# Patient Record
Sex: FEMALE | Race: WHITE | NOT HISPANIC OR LATINO
[De-identification: names, ages, dates, MRNs, and addresses within clinical notes are randomized per-mention and may not be internally consistent; named-entity substitution may affect disease eponyms.]

---

## 2017-07-05 ENCOUNTER — APPOINTMENT (OUTPATIENT)
Dept: SURGERY | Facility: CLINIC | Age: 52
End: 2017-07-05

## 2019-01-30 ENCOUNTER — RECORD ABSTRACTING (OUTPATIENT)
Age: 54
End: 2019-01-30

## 2019-01-30 DIAGNOSIS — Z82.5 FAMILY HISTORY OF ASTHMA AND OTHER CHRONIC LOWER RESPIRATORY DISEASES: ICD-10-CM

## 2019-01-30 DIAGNOSIS — Z86.39 PERSONAL HISTORY OF OTHER ENDOCRINE, NUTRITIONAL AND METABOLIC DISEASE: ICD-10-CM

## 2019-01-30 DIAGNOSIS — Z87.19 PERSONAL HISTORY OF OTHER DISEASES OF THE DIGESTIVE SYSTEM: ICD-10-CM

## 2019-01-30 DIAGNOSIS — E05.20 THYROTOXICOSIS WITH TOXIC MULTINODULAR GOITER W/OUT THYROTOXIC CRISIS OR STORM: ICD-10-CM

## 2019-01-30 DIAGNOSIS — Z82.49 FAMILY HISTORY OF ISCHEMIC HEART DISEASE AND OTHER DISEASES OF THE CIRCULATORY SYSTEM: ICD-10-CM

## 2019-01-30 DIAGNOSIS — Z87.39 PERSONAL HISTORY OF OTHER DISEASES OF THE MUSCULOSKELETAL SYSTEM AND CONNECTIVE TISSUE: ICD-10-CM

## 2019-01-30 DIAGNOSIS — E55.9 VITAMIN D DEFICIENCY, UNSPECIFIED: ICD-10-CM

## 2019-05-01 ENCOUNTER — RX RENEWAL (OUTPATIENT)
Age: 54
End: 2019-05-01

## 2019-10-14 ENCOUNTER — APPOINTMENT (OUTPATIENT)
Dept: INTERNAL MEDICINE | Facility: CLINIC | Age: 54
End: 2019-10-14

## 2019-12-18 ENCOUNTER — EMERGENCY (EMERGENCY)
Facility: HOSPITAL | Age: 54
LOS: 1 days | Discharge: LEFT BEFORE DISCHARGE | End: 2019-12-18
Attending: STUDENT IN AN ORGANIZED HEALTH CARE EDUCATION/TRAINING PROGRAM | Admitting: EMERGENCY MEDICINE
Payer: COMMERCIAL

## 2019-12-18 VITALS
SYSTOLIC BLOOD PRESSURE: 157 MMHG | WEIGHT: 199.96 LBS | TEMPERATURE: 98 F | DIASTOLIC BLOOD PRESSURE: 84 MMHG | OXYGEN SATURATION: 97 % | HEART RATE: 98 BPM | RESPIRATION RATE: 21 BRPM

## 2019-12-18 DIAGNOSIS — Z87.39 PERSONAL HISTORY OF OTHER DISEASES OF THE MUSCULOSKELETAL SYSTEM AND CONNECTIVE TISSUE: Chronic | ICD-10-CM

## 2019-12-18 PROCEDURE — 99053 MED SERV 10PM-8AM 24 HR FAC: CPT

## 2019-12-18 PROCEDURE — 99283 EMERGENCY DEPT VISIT LOW MDM: CPT

## 2019-12-18 RX ORDER — ALPRAZOLAM 0.25 MG
0.5 TABLET ORAL ONCE
Refills: 0 | Status: DISCONTINUED | OUTPATIENT
Start: 2019-12-18 | End: 2019-12-18

## 2019-12-18 RX ADMIN — Medication 0.5 MILLIGRAM(S): at 06:54

## 2019-12-18 NOTE — ED PROVIDER NOTE - PSYCHIATRIC, MLM
Alert and oriented to person, place, time/situation. normal mood and affect. no apparent risk to self or others.   Patient crying but cooperative.  Answering all questions appropriately.  Speaking in full sentences and maintaining composure

## 2019-12-18 NOTE — ED PROVIDER NOTE - PATIENT PORTAL LINK FT
You can access the FollowMyHealth Patient Portal offered by [facility] by registering at the following website: [email or web address]. By joining [facility]’s FollowMyHealth portal, you will also be able to view your health information using other applications (apps) compatible with our system.

## 2019-12-18 NOTE — ED PROVIDER NOTE - CARE PROVIDER_API CALL
Juni Vargas (DO)  Medicine  [facility]  [address], [address]  [place] [address]  Phone: [phone]  Fax: [phone]  Follow Up Time:

## 2019-12-18 NOTE — ED PROVIDER NOTE - CONSTITUTIONAL, MLM
normal... Well appearing, awake, alert, oriented to person, place, time/situation and patient crying in distress

## 2019-12-18 NOTE — ED PROVIDER NOTE - OBJECTIVE STATEMENT
54 year old female with no significant PMH presents with anxiety and grief.  Patient's mother  in ED approximately 2 hours ago and patient has been extremely grief-stricken.  She denies SI/HI.  Patient had been living with her mother for the last 10 years and was her mother's primary caretaker.  Patient also lives with her brother and sister.  She states she will be fine and has plenty of family support.  PMD Dr. Vargas

## 2020-01-31 ENCOUNTER — APPOINTMENT (OUTPATIENT)
Dept: INTERNAL MEDICINE | Facility: CLINIC | Age: 55
End: 2020-01-31
Payer: COMMERCIAL

## 2020-01-31 VITALS
TEMPERATURE: 98.6 F | OXYGEN SATURATION: 98 % | DIASTOLIC BLOOD PRESSURE: 82 MMHG | RESPIRATION RATE: 16 BRPM | WEIGHT: 226 LBS | HEART RATE: 89 BPM | HEIGHT: 63 IN | SYSTOLIC BLOOD PRESSURE: 138 MMHG | BODY MASS INDEX: 40.04 KG/M2

## 2020-01-31 DIAGNOSIS — B34.9 VIRAL INFECTION, UNSPECIFIED: ICD-10-CM

## 2020-01-31 DIAGNOSIS — Z12.39 ENCOUNTER FOR OTHER SCREENING FOR MALIGNANT NEOPLASM OF BREAST: ICD-10-CM

## 2020-01-31 LAB
CREAT SPEC-SCNC: 69
HBA1C MFR BLD HPLC: 6.6
LDLC SERPL DIRECT ASSAY-MCNC: 111
MICROALBUMIN 24H UR DL<=1MG/L-MCNC: 0.4

## 2020-01-31 PROCEDURE — 99214 OFFICE O/P EST MOD 30 MIN: CPT | Mod: 25

## 2020-01-31 PROCEDURE — 87804 INFLUENZA ASSAY W/OPTIC: CPT | Mod: 59,QW

## 2020-01-31 RX ORDER — CLARITHROMYCIN 500 MG/1
500 TABLET, FILM COATED ORAL
Qty: 20 | Refills: 0 | Status: COMPLETED | COMMUNITY
Start: 2020-01-31 | End: 2020-02-10

## 2020-01-31 NOTE — HISTORY OF PRESENT ILLNESS
[Cold Symptoms] : cold symptoms [___ Days ago] : [unfilled] days ago [Moderate] : moderate [Sudden] : suddenly [Congestion] : congestion [Constant] : constant [Fatigue] : fatigue [Stable] : stable [Cough] : no cough [Sore Throat] : no sore throat [Chills] : no chills [Anorexia] : no anorexia [Shortness Of Breath] : no shortness of breath [Headache] : no headache [Earache] : no earache [Fever] : no fever [FreeTextEntry8] : Also here for a blood pressure check.\par \par Recently saw endocrinologist and had labs done on 01/18/220\par \par states she is still very emotional since she lost her Mom about 6 weeks ago.  Still has crying episodes everyday.  States she is seeing a therapist. \par Feels down only because her Mom passed away  \par \par Denies seeing Gyn, Mammo,  Podiatrist or opthalmology\par \par Did get a flu shot   [FreeTextEntry1] : Check and BP check

## 2020-01-31 NOTE — PHYSICAL EXAM
[No Acute Distress] : no acute distress [Well Developed] : well developed [Well Nourished] : well nourished [Well-Appearing] : well-appearing [Normal Sclera/Conjunctiva] : normal sclera/conjunctiva [PERRL] : pupils equal round and reactive to light [EOMI] : extraocular movements intact [Normal Outer Ear/Nose] : the outer ears and nose were normal in appearance [No JVD] : no jugular venous distention [No Lymphadenopathy] : no lymphadenopathy [Supple] : supple [No Accessory Muscle Use] : no accessory muscle use [No Respiratory Distress] : no respiratory distress  [Clear to Auscultation] : lungs were clear to auscultation bilaterally [Regular Rhythm] : with a regular rhythm [Normal Rate] : normal rate  [Normal S1, S2] : normal S1 and S2 [No Abdominal Bruit] : a ~M bruit was not heard ~T in the abdomen [No Carotid Bruits] : no carotid bruits [No Edema] : there was no peripheral edema [Pedal Pulses Present] : the pedal pulses are present [No Palpable Aorta] : no palpable aorta [No Extremity Clubbing/Cyanosis] : no extremity clubbing/cyanosis [Non-distended] : non-distended [Soft] : abdomen soft [Non Tender] : non-tender [No HSM] : no HSM [No Masses] : no abdominal mass palpated [Normal Bowel Sounds] : normal bowel sounds [Normal Anterior Cervical Nodes] : no anterior cervical lymphadenopathy [No CVA Tenderness] : no CVA  tenderness [Normal Posterior Cervical Nodes] : no posterior cervical lymphadenopathy [No Spinal Tenderness] : no spinal tenderness [No Joint Swelling] : no joint swelling [No Rash] : no rash [Grossly Normal Strength/Tone] : grossly normal strength/tone [Coordination Grossly Intact] : coordination grossly intact [Deep Tendon Reflexes (DTR)] : deep tendon reflexes were 2+ and symmetric [Normal Gait] : normal gait [No Focal Deficits] : no focal deficits [Normal Insight/Judgement] : insight and judgment were intact [Normal Affect] : the affect was normal [Obese] : obese [Speech Grossly Normal] : speech grossly normal [Alert and Oriented x3] : oriented to person, place, and time [de-identified] : post nasal drip -  Small oropharynx -  Right TMI  ear tube turned side ways, sever nasal congestion with swollen turbinates, sinus tenderness  [de-identified] : Crying in office talking about her Mom

## 2020-01-31 NOTE — ASSESSMENT
[FreeTextEntry1] : Ms. YADAV is a 54 year  female, who present to the office for check up, review of recent labs, sinus congestion and cold symptoms

## 2020-01-31 NOTE — REVIEW OF SYSTEMS
[Nasal Discharge] : nasal discharge [Recent Change In Weight] : ~T recent weight change [Postnasal Drip] : postnasal drip [Negative] : Heme/Lymph [FreeTextEntry4] : sinus congestion

## 2020-01-31 NOTE — HEALTH RISK ASSESSMENT
[No] : In the past 12 months have you used drugs other than those required for medical reasons? No [No falls in past year] : Patient reported no falls in the past year [3] : 2) Feeling down, depressed, or hopeless for nearly every day (3) [1] : 1) Little interest or pleasure doing things for several days (1) [] : No [Audit-CScore] : 0 [[id number]] : 4 [CFM3Qcgzm] : 12

## 2020-01-31 NOTE — REVIEW OF SYSTEMS
[Recent Change In Weight] : ~T recent weight change [Nasal Discharge] : nasal discharge [Postnasal Drip] : postnasal drip [Negative] : Heme/Lymph [FreeTextEntry4] : sinus congestion

## 2020-01-31 NOTE — COUNSELING
[Benefits of weight loss discussed] : Benefits of weight loss discussed [Encouraged to increase physical activity] : Encouraged to increase physical activity [____ min/wk Activity] : [unfilled] min/wk activity [Decrease Portions] : decrease portions [Good understanding] : Patient has a good understanding of disease, goals and obesity follow-up plan [Behavioral health counseling provided] : Behavioral health counseling provided [AUDIT-C Screening administered and reviewed] : AUDIT-C Screening administered and reviewed [FreeTextEntry2] : ADA diet

## 2020-01-31 NOTE — HEALTH RISK ASSESSMENT
[No falls in past year] : Patient reported no falls in the past year [No] : In the past 12 months have you used drugs other than those required for medical reasons? No [3] : 2) Feeling down, depressed, or hopeless for nearly every day (3) [1] : 1) Little interest or pleasure doing things for several days (1) [] : No [Audit-CScore] : 0 [RZQ2Awxrs] : 4 [XHZ2Qouch] : 12

## 2020-01-31 NOTE — PLAN
[FreeTextEntry1] : ID - viral syndrome check Flu swab was negative.  Advised to increase fluids. \par \par ENT  - Marginotomy tube misplaced/ dislodge - follow up with ENT\par Sinusitis - rhinitis -  Advised to start Flonase and Astelin nasal spray as directed.  Start Biaxin \par \par Psych- Bereavement  - Continue with therapy - Counseling given to the pt.   If persistent will consider SSRI. \par \par Cardio - Hypertension -  Patient was educated about hypertension and the importance of controlling the pressure through lifestyle modification which include low sodium  diet and  aerobic  exercise.  Also discussed the use of prescription medication which included their benefits and their side effects. We discussed the use of ASA 81 mg daily.   Having a BMI less than or equal to 25. Continue with current medication.\par \par \par Endo \par MACKENZIE was provided education about general treatment options. MACKENZIE was advised long term complication of diabetes and its comorbidities.  Patient was advised to routine follow up appointment with podiatry and opthalmology.\par should have repeat hgb a1c every 3 months and microalbumin every 6 months.  MACKENZIE MASONCHRISTOS was advised to call me if any concerns about their diabetes. \par Follow up with endo Q 3 months.  \par Reviewed recent labs\par \par Gyn  - Advised to see GYN for her PAP smear\par Mammo- Rxn given. \par \par RTO  for a physical and pneumonia 23 vax. \par Will call pharmacy for Flu vax information

## 2020-01-31 NOTE — PHYSICAL EXAM
[Well Nourished] : well nourished [Well Developed] : well developed [No Acute Distress] : no acute distress [Normal Sclera/Conjunctiva] : normal sclera/conjunctiva [Well-Appearing] : well-appearing [EOMI] : extraocular movements intact [PERRL] : pupils equal round and reactive to light [Normal Outer Ear/Nose] : the outer ears and nose were normal in appearance [No JVD] : no jugular venous distention [Supple] : supple [No Lymphadenopathy] : no lymphadenopathy [No Accessory Muscle Use] : no accessory muscle use [No Respiratory Distress] : no respiratory distress  [Regular Rhythm] : with a regular rhythm [Normal Rate] : normal rate  [Clear to Auscultation] : lungs were clear to auscultation bilaterally [No Carotid Bruits] : no carotid bruits [No Abdominal Bruit] : a ~M bruit was not heard ~T in the abdomen [Normal S1, S2] : normal S1 and S2 [No Edema] : there was no peripheral edema [Pedal Pulses Present] : the pedal pulses are present [No Extremity Clubbing/Cyanosis] : no extremity clubbing/cyanosis [No Palpable Aorta] : no palpable aorta [Non-distended] : non-distended [Soft] : abdomen soft [Non Tender] : non-tender [No HSM] : no HSM [Normal Bowel Sounds] : normal bowel sounds [No Masses] : no abdominal mass palpated [No CVA Tenderness] : no CVA  tenderness [Normal Anterior Cervical Nodes] : no anterior cervical lymphadenopathy [Normal Posterior Cervical Nodes] : no posterior cervical lymphadenopathy [No Joint Swelling] : no joint swelling [No Spinal Tenderness] : no spinal tenderness [No Rash] : no rash [Coordination Grossly Intact] : coordination grossly intact [Grossly Normal Strength/Tone] : grossly normal strength/tone [Normal Gait] : normal gait [Deep Tendon Reflexes (DTR)] : deep tendon reflexes were 2+ and symmetric [No Focal Deficits] : no focal deficits [Normal Affect] : the affect was normal [Normal Insight/Judgement] : insight and judgment were intact [Obese] : obese [Speech Grossly Normal] : speech grossly normal [Alert and Oriented x3] : oriented to person, place, and time [de-identified] : post nasal drip -  Small oropharynx -  Right TMI  ear tube turned side ways, sever nasal congestion with swollen turbinates, sinus tenderness  [de-identified] : Crying in office talking about her Mom

## 2020-01-31 NOTE — COUNSELING
[Encouraged to increase physical activity] : Encouraged to increase physical activity [Benefits of weight loss discussed] : Benefits of weight loss discussed [____ min/wk Activity] : [unfilled] min/wk activity [Decrease Portions] : decrease portions [Good understanding] : Patient has a good understanding of disease, goals and obesity follow-up plan [Behavioral health counseling provided] : Behavioral health counseling provided [AUDIT-C Screening administered and reviewed] : AUDIT-C Screening administered and reviewed [FreeTextEntry2] : ADA diet

## 2020-01-31 NOTE — HISTORY OF PRESENT ILLNESS
[Cold Symptoms] : cold symptoms [___ Days ago] : [unfilled] days ago [Moderate] : moderate [Sudden] : suddenly [Congestion] : congestion [Constant] : constant [Fatigue] : fatigue [Stable] : stable [Cough] : no cough [Sore Throat] : no sore throat [Chills] : no chills [Anorexia] : no anorexia [Earache] : no earache [Shortness Of Breath] : no shortness of breath [Headache] : no headache [Fever] : no fever [FreeTextEntry8] : Also here for a blood pressure check.\par \par Recently saw endocrinologist and had labs done on 01/18/220\par \par states she is still very emotional since she lost her Mom about 6 weeks ago.  Still has crying episodes everyday.  States she is seeing a therapist. \par Feels down only because her Mom passed away  \par \par Denies seeing Gyn, Mammo,  Podiatrist or opthalmology\par \par Did get a flu shot   [FreeTextEntry1] : Check and BP check

## 2020-04-20 ENCOUNTER — APPOINTMENT (OUTPATIENT)
Dept: INTERNAL MEDICINE | Facility: CLINIC | Age: 55
End: 2020-04-20

## 2020-08-27 ENCOUNTER — APPOINTMENT (OUTPATIENT)
Dept: INTERNAL MEDICINE | Facility: CLINIC | Age: 55
End: 2020-08-27
Payer: COMMERCIAL

## 2020-08-27 ENCOUNTER — LABORATORY RESULT (OUTPATIENT)
Age: 55
End: 2020-08-27

## 2020-08-27 VITALS — SYSTOLIC BLOOD PRESSURE: 120 MMHG | DIASTOLIC BLOOD PRESSURE: 80 MMHG

## 2020-08-27 VITALS
WEIGHT: 229.5 LBS | OXYGEN SATURATION: 98 % | BODY MASS INDEX: 40.66 KG/M2 | RESPIRATION RATE: 18 BRPM | HEART RATE: 95 BPM | HEIGHT: 63 IN | DIASTOLIC BLOOD PRESSURE: 92 MMHG | TEMPERATURE: 97.9 F | SYSTOLIC BLOOD PRESSURE: 112 MMHG

## 2020-08-27 DIAGNOSIS — R25.2 CRAMP AND SPASM: ICD-10-CM
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
## 2020-08-27 DIAGNOSIS — M79.604 PAIN IN RIGHT LEG: ICD-10-CM

## 2020-08-27 LAB
BILIRUB UR QL STRIP: NEGATIVE
CLARITY UR: CLEAR
COLLECTION METHOD: NORMAL
GLUCOSE UR-MCNC: NEGATIVE
HCG UR QL: 0.2 EU/DL
HGB UR QL STRIP.AUTO: NEGATIVE
KETONES UR-MCNC: NEGATIVE
LEUKOCYTE ESTERASE UR QL STRIP: NEGATIVE
NITRITE UR QL STRIP: NEGATIVE
PH UR STRIP: 5.5
PROT UR STRIP-MCNC: NEGATIVE
SP GR UR STRIP: 1.03

## 2020-08-27 PROCEDURE — 99214 OFFICE O/P EST MOD 30 MIN: CPT | Mod: 25

## 2020-08-27 PROCEDURE — 36415 COLL VENOUS BLD VENIPUNCTURE: CPT

## 2020-08-27 PROCEDURE — 81003 URINALYSIS AUTO W/O SCOPE: CPT | Mod: QW

## 2020-08-27 NOTE — REVIEW OF SYSTEMS
[Recent Change In Weight] : ~T recent weight change [Negative] : Heme/Lymph [Nasal Discharge] : no nasal discharge [Postnasal Drip] : no postnasal drip [Chest Pain] : no chest pain [Palpitations] : no palpitations [Cough] : no cough [Leg Claudication] : no leg claudication [Shortness Of Breath] : no shortness of breath [Insomnia] : no insomnia [Easy Bleeding] : no easy bleeding [Easy Bruising] : no easy bruising [FreeTextEntry9] : leg pain

## 2020-08-27 NOTE — COUNSELING
[Behavioral health counseling provided] : Behavioral health counseling provided [AUDIT-C Screening administered and reviewed] : AUDIT-C Screening administered and reviewed [Encouraged to increase physical activity] : Encouraged to increase physical activity [Benefits of weight loss discussed] : Benefits of weight loss discussed [Decrease Portions] : decrease portions [____ min/wk Activity] : [unfilled] min/wk activity [Good understanding] : Patient has a good understanding of disease, goals and obesity follow-up plan [FreeTextEntry2] : ADA diet

## 2020-08-27 NOTE — HISTORY OF PRESENT ILLNESS
[Musculoskeletal Symptoms Legs] : leg [___ Weeks ago] : [unfilled] weeks ago [Paroxysmal] : paroxysmal [Moderate] : moderate [Activity] : with activity [Stable] : stable [FreeTextEntry2] : leg cramps  [FreeTextEntry4] : sitting  [FreeTextEntry8] : [place] the beach - was bit or stung and the next days leg was swollen and red.  Went to an urgent care clinic given doxy for cellulitis.\par States the redness decreased slightly but still there.  Still with leg pain behind the knee.  Associated leg cramps \par \par Also been having finger numbness that comes and goes \par \par HAd blood work done by endo a1c was 6.9  will have results sent to the office \par \par Denies going for a retinal eye exam of doing the fit test as requested  [FreeTextEntry5] : unaware

## 2020-08-27 NOTE — PHYSICAL EXAM
[No Acute Distress] : no acute distress [Well Nourished] : well nourished [Well Developed] : well developed [Well-Appearing] : well-appearing [PERRL] : pupils equal round and reactive to light [Normal Sclera/Conjunctiva] : normal sclera/conjunctiva [EOMI] : extraocular movements intact [Normal Outer Ear/Nose] : the outer ears and nose were normal in appearance [No JVD] : no jugular venous distention [No Lymphadenopathy] : no lymphadenopathy [Supple] : supple [No Respiratory Distress] : no respiratory distress  [No Accessory Muscle Use] : no accessory muscle use [Clear to Auscultation] : lungs were clear to auscultation bilaterally [Normal Rate] : normal rate  [Regular Rhythm] : with a regular rhythm [Normal S1, S2] : normal S1 and S2 [No Carotid Bruits] : no carotid bruits [No Abdominal Bruit] : a ~M bruit was not heard ~T in the abdomen [Pedal Pulses Present] : the pedal pulses are present [No Palpable Aorta] : no palpable aorta [No Extremity Clubbing/Cyanosis] : no extremity clubbing/cyanosis [Soft] : abdomen soft [Non Tender] : non-tender [Non-distended] : non-distended [No Masses] : no abdominal mass palpated [Normal Bowel Sounds] : normal bowel sounds [No HSM] : no HSM [Obese] : obese [Normal Posterior Cervical Nodes] : no posterior cervical lymphadenopathy [No CVA Tenderness] : no CVA  tenderness [Normal Anterior Cervical Nodes] : no anterior cervical lymphadenopathy [No Spinal Tenderness] : no spinal tenderness [No Joint Swelling] : no joint swelling [Grossly Normal Strength/Tone] : grossly normal strength/tone [Coordination Grossly Intact] : coordination grossly intact [No Focal Deficits] : no focal deficits [Normal Gait] : normal gait [Deep Tendon Reflexes (DTR)] : deep tendon reflexes were 2+ and symmetric [Speech Grossly Normal] : speech grossly normal [Normal Affect] : the affect was normal [Normal Insight/Judgement] : insight and judgment were intact [Alert and Oriented x3] : oriented to person, place, and time [Scoliosis] : scoliosis [de-identified] : post nasal drip -  Small oropharynx -   [de-identified] : right lower ext tenderness to palp   [de-identified] : Crying in office talking about her Mom  [de-identified] : right leg small lesion near site of cellulitis about 4mm macula

## 2020-08-27 NOTE — PLAN
[FreeTextEntry1] : ID - insect bite cellulitis  -  Check labs   start doxy 100 mg bid for 7 days \par \par Vasc-  Leg pain  -   Check Doppler lower ext.  Elevate leg.   \par Leg cramps check CMP, increase fluids \par \par Cardio - Hypertension -  Patient was educated about hypertension and the importance of controlling the pressure through lifestyle modification which include low sodium  diet and  aerobic  exercise.  Also discussed the use of prescription medication which included their benefits and their side effects. We discussed the use of ASA 81 mg daily.   Having a BMI less than or equal to 25. Continue with current medication.\par \par Endo \par MACKENZIE was provided education about general treatment options. MACKENZIE was advised long term complication of diabetes and its comorbidities.  Patient was advised to routine follow up appointment with podiatry and opthalmology.\par should have repeat hgb a1c every 3 months and microalbumin every 6 months.  MACKENZIE YADAV was advised to call me if any concerns about their diabetes. \par Follow up with endo Q 3 months.  \par request recent labs \par \par Gyn  - Advised to see GYN for her PAP smear\par Mammo- Rxn given. \par \par RTO  for a physical, flu  shot \par \par Patient  education  - COVID-19   Counseling and education provided to the patient.  Advised sign and symptoms of the virus.  Advised contact precautions.  Educated patient on proper hand washing and to participate in social distancing. \par Patient is in full awareness of the plan and agrees to it.  All pt question was answered.  \par \par I spent 25 Minutes with the patient, half of which we discussed finding on physical exam  and coordinated care.  As well as reviewed my plans and follow ups.  \par

## 2020-08-27 NOTE — ASSESSMENT
[FreeTextEntry1] : Ms. YADAV is a 54 year  female, who present to the office for leg pain and follow up on cellulitis

## 2020-08-27 NOTE — HEALTH RISK ASSESSMENT
[No] : In the past 12 months have you used drugs other than those required for medical reasons? No [No falls in past year] : Patient reported no falls in the past year [1] : 1) Little interest or pleasure doing things for several days (1) [3] : 2) Feeling down, depressed, or hopeless for nearly every day (3) [Audit-CScore] : 0 [[id number]] : 4 [] : No [CDN4Eoxpx] : 12

## 2020-08-28 LAB
ALBUMIN SERPL ELPH-MCNC: 4.6 G/DL
ALP BLD-CCNC: 115 U/L
ALT SERPL-CCNC: 60 U/L
ANION GAP SERPL CALC-SCNC: 13 MMOL/L
AST SERPL-CCNC: 40 U/L
B BURGDOR IGG+IGM SER QL IB: NORMAL
BASOPHILS # BLD AUTO: 0.04 K/UL
BASOPHILS NFR BLD AUTO: 0.5 %
BILIRUB SERPL-MCNC: 0.2 MG/DL
BUN SERPL-MCNC: 21 MG/DL
CALCIUM SERPL-MCNC: 9.7 MG/DL
CHLORIDE SERPL-SCNC: 100 MMOL/L
CO2 SERPL-SCNC: 28 MMOL/L
CREAT SERPL-MCNC: 0.87 MG/DL
EOSINOPHIL # BLD AUTO: 0.45 K/UL
EOSINOPHIL NFR BLD AUTO: 5.2 %
GLUCOSE SERPL-MCNC: 138 MG/DL
HCT VFR BLD CALC: 44.4 %
HGB BLD-MCNC: 13.6 G/DL
IMM GRANULOCYTES NFR BLD AUTO: 0.2 %
LYMPHOCYTES # BLD AUTO: 2.1 K/UL
LYMPHOCYTES NFR BLD AUTO: 24.1 %
MAN DIFF?: NORMAL
MCHC RBC-ENTMCNC: 27.6 PG
MCHC RBC-ENTMCNC: 30.6 GM/DL
MCV RBC AUTO: 90.1 FL
MONOCYTES # BLD AUTO: 0.66 K/UL
MONOCYTES NFR BLD AUTO: 7.6 %
NEUTROPHILS # BLD AUTO: 5.44 K/UL
NEUTROPHILS NFR BLD AUTO: 62.4 %
PLATELET # BLD AUTO: 259 K/UL
POTASSIUM SERPL-SCNC: 4.3 MMOL/L
PROT SERPL-MCNC: 6.7 G/DL
RBC # BLD: 4.93 M/UL
RBC # FLD: 13.6 %
SODIUM SERPL-SCNC: 142 MMOL/L
WBC # FLD AUTO: 8.71 K/UL

## 2020-09-02 LAB
CHOLEST SERPL-MCNC: 184
ESTIMATED AVERAGE GLUCOSE: 134
HBA1C MFR BLD HPLC: 6.9
HDLC SERPL-MCNC: 45
LDLC SERPL CALC-MCNC: 105
TRIGL SERPL-MCNC: 222

## 2020-09-08 LAB

## 2020-09-21 ENCOUNTER — APPOINTMENT (OUTPATIENT)
Dept: INTERNAL MEDICINE | Facility: CLINIC | Age: 55
End: 2020-09-21

## 2020-10-05 ENCOUNTER — APPOINTMENT (OUTPATIENT)
Dept: INTERNAL MEDICINE | Facility: CLINIC | Age: 55
End: 2020-10-05
Payer: COMMERCIAL

## 2020-10-05 ENCOUNTER — MED ADMIN CHARGE (OUTPATIENT)
Age: 55
End: 2020-10-05

## 2020-10-05 VITALS
TEMPERATURE: 97.9 F | RESPIRATION RATE: 16 BRPM | WEIGHT: 225 LBS | SYSTOLIC BLOOD PRESSURE: 112 MMHG | DIASTOLIC BLOOD PRESSURE: 70 MMHG | HEART RATE: 79 BPM | OXYGEN SATURATION: 98 % | HEIGHT: 63 IN | BODY MASS INDEX: 39.87 KG/M2

## 2020-10-05 DIAGNOSIS — R79.89 OTHER SPECIFIED ABNORMAL FINDINGS OF BLOOD CHEMISTRY: ICD-10-CM

## 2020-10-05 PROCEDURE — G0009: CPT

## 2020-10-05 PROCEDURE — 90472 IMMUNIZATION ADMIN EACH ADD: CPT

## 2020-10-05 PROCEDURE — 99213 OFFICE O/P EST LOW 20 MIN: CPT | Mod: 25

## 2020-10-05 PROCEDURE — 90732 PPSV23 VACC 2 YRS+ SUBQ/IM: CPT

## 2020-10-05 PROCEDURE — 90686 IIV4 VACC NO PRSV 0.5 ML IM: CPT

## 2020-10-05 PROCEDURE — 36415 COLL VENOUS BLD VENIPUNCTURE: CPT

## 2020-10-05 RX ORDER — DOXYCYCLINE HYCLATE 100 MG/1
100 CAPSULE ORAL TWICE DAILY
Qty: 14 | Refills: 0 | Status: DISCONTINUED | COMMUNITY
Start: 2020-08-27 | End: 2020-10-05

## 2020-10-05 RX ORDER — FEXOFENADINE HYDROCHLORIDE 180 MG/1
180 TABLET, FILM COATED ORAL DAILY
Refills: 0 | Status: DISCONTINUED | COMMUNITY
End: 2020-10-05

## 2020-10-06 RX ORDER — METHYLPREDNISOLONE 4 MG/1
4 TABLET ORAL
Qty: 21 | Refills: 0 | Status: COMPLETED | COMMUNITY
Start: 2020-05-18

## 2020-10-06 RX ORDER — MELOXICAM 15 MG/1
15 TABLET ORAL
Qty: 30 | Refills: 0 | Status: COMPLETED | COMMUNITY
Start: 2020-06-01

## 2020-10-06 RX ORDER — CYCLOBENZAPRINE HYDROCHLORIDE 10 MG/1
10 TABLET, FILM COATED ORAL
Qty: 90 | Refills: 0 | Status: COMPLETED | COMMUNITY
Start: 2020-05-18

## 2020-10-06 RX ORDER — DOXYCYCLINE HYCLATE 100 MG/1
100 TABLET ORAL
Qty: 20 | Refills: 0 | Status: COMPLETED | COMMUNITY
Start: 2020-04-07

## 2020-10-06 RX ORDER — LEVOTHYROXINE SODIUM 200 UG/1
200 TABLET ORAL
Qty: 90 | Refills: 0 | Status: ACTIVE | COMMUNITY
Start: 2020-08-10

## 2020-10-06 RX ORDER — DOXYCYCLINE 100 MG/1
100 TABLET, FILM COATED ORAL
Qty: 14 | Refills: 0 | Status: COMPLETED | COMMUNITY
Start: 2020-08-12

## 2020-10-06 NOTE — COUNSELING
[Behavioral health counseling provided] : Behavioral health counseling provided [AUDIT-C Screening administered and reviewed] : AUDIT-C Screening administered and reviewed [Benefits of weight loss discussed] : Benefits of weight loss discussed [Encouraged to increase physical activity] : Encouraged to increase physical activity [Decrease Portions] : decrease portions [____ min/wk Activity] : [unfilled] min/wk activity [Good understanding] : Patient has a good understanding of disease, goals and obesity follow-up plan [FreeTextEntry2] : ADA diet

## 2020-10-06 NOTE — HEALTH RISK ASSESSMENT
[No] : In the past 12 months have you used drugs other than those required for medical reasons? No [No falls in past year] : Patient reported no falls in the past year [1] : 1) Little interest or pleasure doing things for several days (1) [3] : 2) Feeling down, depressed, or hopeless for nearly every day (3) [] : No [Audit-CScore] : 0 [BYA8Fyapd] : 4 [[id number]] : 12

## 2020-10-06 NOTE — PHYSICAL EXAM
[No Acute Distress] : no acute distress [Well Nourished] : well nourished [Well Developed] : well developed [Well-Appearing] : well-appearing [Normal Sclera/Conjunctiva] : normal sclera/conjunctiva [PERRL] : pupils equal round and reactive to light [EOMI] : extraocular movements intact [Normal Outer Ear/Nose] : the outer ears and nose were normal in appearance [No JVD] : no jugular venous distention [No Lymphadenopathy] : no lymphadenopathy [Supple] : supple [No Respiratory Distress] : no respiratory distress  [No Accessory Muscle Use] : no accessory muscle use [Clear to Auscultation] : lungs were clear to auscultation bilaterally [Normal Rate] : normal rate  [Regular Rhythm] : with a regular rhythm [Normal S1, S2] : normal S1 and S2 [No Carotid Bruits] : no carotid bruits [No Abdominal Bruit] : a ~M bruit was not heard ~T in the abdomen [Pedal Pulses Present] : the pedal pulses are present [No Palpable Aorta] : no palpable aorta [No Extremity Clubbing/Cyanosis] : no extremity clubbing/cyanosis [Soft] : abdomen soft [Non Tender] : non-tender [Non-distended] : non-distended [No Masses] : no abdominal mass palpated [No HSM] : no HSM [Normal Bowel Sounds] : normal bowel sounds [Obese] : obese [Normal Posterior Cervical Nodes] : no posterior cervical lymphadenopathy [Normal Anterior Cervical Nodes] : no anterior cervical lymphadenopathy [No CVA Tenderness] : no CVA  tenderness [No Spinal Tenderness] : no spinal tenderness [Scoliosis] : scoliosis [No Joint Swelling] : no joint swelling [Grossly Normal Strength/Tone] : grossly normal strength/tone [Coordination Grossly Intact] : coordination grossly intact [No Focal Deficits] : no focal deficits [Normal Gait] : normal gait [Deep Tendon Reflexes (DTR)] : deep tendon reflexes were 2+ and symmetric [Speech Grossly Normal] : speech grossly normal [Normal Affect] : the affect was normal [Alert and Oriented x3] : oriented to person, place, and time [Normal Insight/Judgement] : insight and judgment were intact [No Rash] : no rash [Normal Mood] : the mood was normal [de-identified] : post nasal drip -  Small oropharynx -

## 2020-10-06 NOTE — PLAN
[FreeTextEntry1] : \par Cardio - Hypertension -  Patient was educated about hypertension and the importance of controlling the pressure through lifestyle modification which include low sodium  diet and  aerobic  exercise.  Also discussed the use of prescription medication which included their benefits and their side effects. We discussed the use of ASA 81 mg daily.   Having a BMI less than or equal to 25. Continue with current medication.\par \par Endo \par MACKENZIE was provided education about general treatment options. MACKENZIE was advised long term complication of diabetes and its comorbidities.  Patient was advised to routine follow up appointment with podiatry and opthalmology.\par should have repeat hgb a1c every 3 months and microalbumin every 6 months.  MACKENZIE YADAV was advised to call me if any concerns about their diabetes. \par Follow up with endo Q 3 months.  \par request recent labs \par \par Gyn  - Advised to see GYN for her PAP smear\par Mammo- Rxn given. \par \par RTO  for a physical, \par \par Patient  education  - COVID-19   Counseling and education provided to the patient.  Advised sign and symptoms of the virus.  Advised contact precautions.  Educated patient on proper hand washing and to participate in social distancing. \par Patient is in full awareness of the plan and agrees to it.  All pt question was answered.  \par \par Immunization - Flu shot  and pneumonia vax 23 given - consent formed signed.  CDC sheet given for pt educations \par I spent 18 Minutes with the patient, half of which we discussed finding on physical exam  and coordinated care.  As well as reviewed my plans and follow ups.  \par

## 2020-10-06 NOTE — HISTORY OF PRESENT ILLNESS
[FreeTextEntry1] : Immunization update  [de-identified] : Ms. YADAV is a 54 year  female, who present to the office for flu shot and a check up.  States she recently had blood work done by endo.  States her blood glucose levels where good. \par Denies any recent change in medical conditions or any new medications

## 2020-10-06 NOTE — REVIEW OF SYSTEMS
[Recent Change In Weight] : ~T recent weight change [Negative] : Heme/Lymph [Nasal Discharge] : no nasal discharge [Postnasal Drip] : no postnasal drip [Chest Pain] : no chest pain [Palpitations] : no palpitations [Leg Claudication] : no leg claudication [Shortness Of Breath] : no shortness of breath [Cough] : no cough [Insomnia] : no insomnia [Easy Bleeding] : no easy bleeding [Easy Bruising] : no easy bruising [FreeTextEntry9] : leg pain

## 2020-10-06 NOTE — ASSESSMENT
[FreeTextEntry1] : Ms. YADAV is a 54 year  female, who present to the office for flu shot and check up

## 2020-10-12 LAB
ALBUMIN SERPL ELPH-MCNC: 4.6 G/DL
ALP BLD-CCNC: 110 U/L
ALT SERPL-CCNC: 56 U/L
ANION GAP SERPL CALC-SCNC: 12 MMOL/L
AST SERPL-CCNC: 35 U/L
BILIRUB SERPL-MCNC: 0.2 MG/DL
BUN SERPL-MCNC: 19 MG/DL
CALCIUM SERPL-MCNC: 9 MG/DL
CHLORIDE SERPL-SCNC: 103 MMOL/L
CO2 SERPL-SCNC: 27 MMOL/L
CREAT SERPL-MCNC: 0.68 MG/DL
GLUCOSE SERPL-MCNC: 171 MG/DL
POTASSIUM SERPL-SCNC: 4 MMOL/L
PROT SERPL-MCNC: 6.4 G/DL
SODIUM SERPL-SCNC: 142 MMOL/L

## 2020-11-05 ENCOUNTER — NON-APPOINTMENT (OUTPATIENT)
Age: 55
End: 2020-11-05

## 2021-10-11 ENCOUNTER — MED ADMIN CHARGE (OUTPATIENT)
Age: 56
End: 2021-10-11

## 2021-10-11 ENCOUNTER — APPOINTMENT (OUTPATIENT)
Dept: INTERNAL MEDICINE | Facility: CLINIC | Age: 56
End: 2021-10-11
Payer: COMMERCIAL

## 2021-10-11 VITALS
OXYGEN SATURATION: 98 % | DIASTOLIC BLOOD PRESSURE: 84 MMHG | RESPIRATION RATE: 16 BRPM | HEIGHT: 63 IN | TEMPERATURE: 97.8 F | SYSTOLIC BLOOD PRESSURE: 138 MMHG | HEART RATE: 88 BPM | BODY MASS INDEX: 38.62 KG/M2 | WEIGHT: 218 LBS

## 2021-10-11 DIAGNOSIS — Z87.2 PERSONAL HISTORY OF DISEASES OF THE SKIN AND SUBCUTANEOUS TISSUE: ICD-10-CM

## 2021-10-11 DIAGNOSIS — Z23 ENCOUNTER FOR IMMUNIZATION: ICD-10-CM

## 2021-10-11 DIAGNOSIS — Z12.11 ENCOUNTER FOR SCREENING FOR MALIGNANT NEOPLASM OF COLON: ICD-10-CM

## 2021-10-11 PROCEDURE — G0442 ANNUAL ALCOHOL SCREEN 15 MIN: CPT

## 2021-10-11 PROCEDURE — 99214 OFFICE O/P EST MOD 30 MIN: CPT | Mod: 25

## 2021-10-11 PROCEDURE — 90686 IIV4 VACC NO PRSV 0.5 ML IM: CPT

## 2021-10-11 PROCEDURE — G0008: CPT

## 2021-10-11 RX ORDER — OMEPRAZOLE 40 MG/1
40 CAPSULE, DELAYED RELEASE ORAL
Qty: 90 | Refills: 0 | Status: ACTIVE | COMMUNITY

## 2021-10-11 RX ORDER — FLUTICASONE PROPIONATE 50 UG/1
50 SPRAY, METERED NASAL TWICE DAILY
Qty: 1 | Refills: 0 | Status: DISCONTINUED | COMMUNITY
Start: 2020-01-31 | End: 2021-10-11

## 2021-10-11 RX ORDER — METFORMIN ER 500 MG 500 MG/1
500 TABLET ORAL
Qty: 180 | Refills: 0 | Status: DISCONTINUED | COMMUNITY
Start: 2020-07-07 | End: 2021-10-11

## 2021-10-11 RX ORDER — AZELASTINE HYDROCHLORIDE 205.5 UG/1
0.15 SPRAY, METERED NASAL
Qty: 1 | Refills: 0 | Status: DISCONTINUED | COMMUNITY
End: 2021-10-11

## 2021-10-11 RX ORDER — SIMETHICONE 125 MG/1
CAPSULE, LIQUID FILLED ORAL
Refills: 0 | Status: DISCONTINUED | COMMUNITY
End: 2021-10-11

## 2021-10-11 NOTE — PHYSICAL EXAM
[Well Nourished] : well nourished [Well Developed] : well developed [Well-Appearing] : well-appearing [Normal Sclera/Conjunctiva] : normal sclera/conjunctiva [PERRL] : pupils equal round and reactive to light [EOMI] : extraocular movements intact [Normal Outer Ear/Nose] : the outer ears and nose were normal in appearance [No JVD] : no jugular venous distention [No Lymphadenopathy] : no lymphadenopathy [Supple] : supple [No Respiratory Distress] : no respiratory distress  [No Accessory Muscle Use] : no accessory muscle use [Clear to Auscultation] : lungs were clear to auscultation bilaterally [Normal Rate] : normal rate  [Regular Rhythm] : with a regular rhythm [Normal S1, S2] : normal S1 and S2 [No Carotid Bruits] : no carotid bruits [No Abdominal Bruit] : a ~M bruit was not heard ~T in the abdomen [Pedal Pulses Present] : the pedal pulses are present [No Palpable Aorta] : no palpable aorta [No Extremity Clubbing/Cyanosis] : no extremity clubbing/cyanosis [Soft] : abdomen soft [Non Tender] : non-tender [Non-distended] : non-distended [No Masses] : no abdominal mass palpated [No HSM] : no HSM [Normal Bowel Sounds] : normal bowel sounds [Obese] : obese [Normal Posterior Cervical Nodes] : no posterior cervical lymphadenopathy [Normal Anterior Cervical Nodes] : no anterior cervical lymphadenopathy [No CVA Tenderness] : no CVA  tenderness [No Spinal Tenderness] : no spinal tenderness [Scoliosis] : scoliosis [No Joint Swelling] : no joint swelling [Grossly Normal Strength/Tone] : grossly normal strength/tone [No Rash] : no rash [Coordination Grossly Intact] : coordination grossly intact [No Focal Deficits] : no focal deficits [Normal Gait] : normal gait [Deep Tendon Reflexes (DTR)] : deep tendon reflexes were 2+ and symmetric [Speech Grossly Normal] : speech grossly normal [Normal Affect] : the affect was normal [Alert and Oriented x3] : oriented to person, place, and time [Normal Mood] : the mood was normal [Normal Insight/Judgement] : insight and judgment were intact [No Acute Distress] : no acute distress [de-identified] : post nasal drip -  Small oropharynx -   [de-identified] : refused foot exam

## 2021-10-11 NOTE — HISTORY OF PRESENT ILLNESS
[FreeTextEntry1] : Immunization update  [de-identified] : Mrs. YADAV is a 54 year  female, who present to the office for flu shot and a check up.  States she recently had blood work done by her endo, Dr Atwood.  Has a follow up appointment today\par Did have an Eye exam at [facility] in [place] within the year  unaware of having any retinopathy\par Completed the covid vax.\par States she had a recent fall - went to urgent care for evaluation.  x-ray was negative.  Is feeling better \par Denies completing stool (FITDNA) sample \par \par

## 2021-10-11 NOTE — REVIEW OF SYSTEMS
[Postnasal Drip] : postnasal drip [Negative] : Constitutional [Recent Change In Weight] : ~T no recent weight change [Nasal Discharge] : no nasal discharge [Chest Pain] : no chest pain [Palpitations] : no palpitations [Leg Claudication] : no leg claudication [Shortness Of Breath] : no shortness of breath [Cough] : no cough [Insomnia] : no insomnia [Easy Bleeding] : no easy bleeding [Easy Bruising] : no easy bruising [Swollen Glands] : no swollen glands [FreeTextEntry9] : left buttock pain s/p fall

## 2021-10-11 NOTE — HEALTH RISK ASSESSMENT
[No] : In the past 12 months have you used drugs other than those required for medical reasons? No [Yes] : Yes [Monthly or less (1 pt)] : Monthly or less (1 point) [1 or 2 (0 pts)] : 1 or 2 (0 points) [Never (0 pts)] : Never (0 points) [One fall no injury in past year] : Patient reported one fall in the past year without injury [0] : 2) Feeling down, depressed, or hopeless: Not at all (0) [PHQ-2 Negative - No further assessment needed] : PHQ-2 Negative - No further assessment needed [Audit-CScore] : 1 [de-identified] : Regular  [de-identified] : walk  [de-identified] : bruised left lower buttock area  [[id number]] : 0

## 2021-10-11 NOTE — PLAN
[FreeTextEntry1] : Cardio - Hypertension -  Patient was educated about hypertension and the importance of controlling the pressure through lifestyle modification which include low sodium  diet and  aerobic  exercise.  Also discussed the use of prescription medication which included their benefits and their side effects. We discussed the use of ASA 81 mg daily.   Having a BMI less than or equal to 25. Continue with current medication.\par \par Endo \par MACKENZIE was provided education about general treatment options. MACKENZIE was advised long term complication of diabetes and its comorbidities.  Patient was advised to routine follow up appointment with podiatry and opthalmology.\par should have repeat hgb a1c every 3 months and microalbumin every 6 months.  MACKENZIE YADAV was advised to call me if any concerns about their diabetes. \par Follow up with endo Q 3 months.  \par request recent labs \par \par Gyn  - Advised to see GYN for her PAP smear\par  \par GI  screening for colon cancer - Advised colonoscopy - rxn for Fit DNA was given \par \par RTO  for a physical, \par \par Patient  education  - COVID-19   Counseling and education provided to the patient.  Advised sign and symptoms of the virus.  Advised contact precautions.  Educated patient on proper hand washing and to participate in social distancing. Completed covid vax-  discussed booster vax\par Patient is in full awareness of the plan and agrees to it.  All pt question was answered.  \par \par Immunization - Flu shot give - education given   signed consent

## 2021-10-11 NOTE — ASSESSMENT
[FreeTextEntry1] : Ms. YADAV is a 55 year  female, who present to the office for flu shot and check up.

## 2021-10-12 DIAGNOSIS — E53.8 DEFICIENCY OF OTHER SPECIFIED B GROUP VITAMINS: ICD-10-CM

## 2021-10-22 ENCOUNTER — NON-APPOINTMENT (OUTPATIENT)
Age: 56
End: 2021-10-22

## 2021-10-22 RX ORDER — ROSUVASTATIN CALCIUM 10 MG/1
10 TABLET, FILM COATED ORAL
Qty: 30 | Refills: 0 | Status: DISCONTINUED | COMMUNITY
Start: 2019-05-01 | End: 2021-10-22

## 2022-01-24 LAB — HBA1C MFR BLD HPLC: 6.6

## 2022-04-21 LAB
CHOLEST SERPL-MCNC: 153
HBA1C MFR BLD HPLC: 6.9
HDLC SERPL-MCNC: 45
HDLC SERPL: 3.4
LDLC SERPL CALC-MCNC: 85
TRIGL SERPL-MCNC: 134

## 2022-08-24 ENCOUNTER — RESULT CHARGE (OUTPATIENT)
Age: 57
End: 2022-08-24

## 2022-08-26 ENCOUNTER — TRANSCRIPTION ENCOUNTER (OUTPATIENT)
Age: 57
End: 2022-08-26

## 2022-08-26 ENCOUNTER — NON-APPOINTMENT (OUTPATIENT)
Age: 57
End: 2022-08-26

## 2022-08-26 ENCOUNTER — APPOINTMENT (OUTPATIENT)
Dept: INTERNAL MEDICINE | Facility: CLINIC | Age: 57
End: 2022-08-26

## 2022-08-26 VITALS
BODY MASS INDEX: 38.65 KG/M2 | RESPIRATION RATE: 16 BRPM | HEIGHT: 63 IN | HEART RATE: 91 BPM | DIASTOLIC BLOOD PRESSURE: 70 MMHG | TEMPERATURE: 97.3 F | OXYGEN SATURATION: 98 % | WEIGHT: 218.13 LBS | SYSTOLIC BLOOD PRESSURE: 132 MMHG

## 2022-08-26 DIAGNOSIS — R07.81 PLEURODYNIA: ICD-10-CM

## 2022-08-26 DIAGNOSIS — J30.2 OTHER SEASONAL ALLERGIC RHINITIS: ICD-10-CM

## 2022-08-26 PROCEDURE — 99396 PREV VISIT EST AGE 40-64: CPT | Mod: 25

## 2022-08-26 PROCEDURE — 99213 OFFICE O/P EST LOW 20 MIN: CPT | Mod: 25

## 2022-08-26 PROCEDURE — 93000 ELECTROCARDIOGRAM COMPLETE: CPT

## 2022-08-28 PROBLEM — R07.81 RIB PAIN ON RIGHT SIDE: Status: ACTIVE | Noted: 2022-08-28

## 2022-08-28 PROBLEM — J30.2 SEASONAL ALLERGIES: Status: ACTIVE | Noted: 2022-08-28

## 2022-08-28 RX ORDER — BLOOD SUGAR DIAGNOSTIC
STRIP MISCELLANEOUS
Qty: 300 | Refills: 0 | Status: ACTIVE | COMMUNITY
Start: 2022-04-21

## 2022-08-28 RX ORDER — LANCETS 28 GAUGE
EACH MISCELLANEOUS
Qty: 300 | Refills: 0 | Status: ACTIVE | COMMUNITY
Start: 2022-04-21

## 2022-08-28 RX ORDER — BLOOD-GLUCOSE METER
W/DEVICE KIT MISCELLANEOUS
Qty: 1 | Refills: 0 | Status: ACTIVE | COMMUNITY
Start: 2022-04-21

## 2022-08-28 NOTE — ADDENDUM
[FreeTextEntry1] : I, Christophe Roberts, acted solely as scribe for Dr. Juni Vargas DO on this date 08/26/2022 10:50AM .\par \par All medical record entries made by the Scribe were at my, Dr. Juni Vargas DO direction and personally dictated by me on 08/26/2022 10:50AM. I have reviewed the chart and agree that the record accurately reflects my personal performance of the history, physical exam, assessment and plan. I have also personally directed, reviewed and agreed with the chart.

## 2022-08-28 NOTE — ASSESSMENT
[FreeTextEntry1] : Patient is a 56 year old female with a past medical history as above who presents for an annual wellness visit.

## 2022-08-28 NOTE — PHYSICAL EXAM
[No Acute Distress] : no acute distress [Well Nourished] : well nourished [Well Developed] : well developed [Well-Appearing] : well-appearing [Normal Sclera/Conjunctiva] : normal sclera/conjunctiva [PERRL] : pupils equal round and reactive to light [EOMI] : extraocular movements intact [Normal Outer Ear/Nose] : the outer ears and nose were normal in appearance [Normal Oropharynx] : the oropharynx was normal [No JVD] : no jugular venous distention [No Lymphadenopathy] : no lymphadenopathy [Supple] : supple [Thyroid Normal, No Nodules] : the thyroid was normal and there were no nodules present [No Respiratory Distress] : no respiratory distress  [No Accessory Muscle Use] : no accessory muscle use [Clear to Auscultation] : lungs were clear to auscultation bilaterally [Normal Rate] : normal rate  [Regular Rhythm] : with a regular rhythm [Normal S1, S2] : normal S1 and S2 [No Murmur] : no murmur heard [No Carotid Bruits] : no carotid bruits [No Abdominal Bruit] : a ~M bruit was not heard ~T in the abdomen [No Varicosities] : no varicosities [Pedal Pulses Present] : the pedal pulses are present [No Edema] : there was no peripheral edema [No Palpable Aorta] : no palpable aorta [No Extremity Clubbing/Cyanosis] : no extremity clubbing/cyanosis [Soft] : abdomen soft [Non Tender] : non-tender [Non-distended] : non-distended [No Masses] : no abdominal mass palpated [No HSM] : no HSM [Normal Bowel Sounds] : normal bowel sounds [Normal Posterior Cervical Nodes] : no posterior cervical lymphadenopathy [Normal Anterior Cervical Nodes] : no anterior cervical lymphadenopathy [No CVA Tenderness] : no CVA  tenderness [No Spinal Tenderness] : no spinal tenderness [No Joint Swelling] : no joint swelling [Grossly Normal Strength/Tone] : grossly normal strength/tone [No Rash] : no rash [Coordination Grossly Intact] : coordination grossly intact [No Focal Deficits] : no focal deficits [Normal Gait] : normal gait [Deep Tendon Reflexes (DTR)] : deep tendon reflexes were 2+ and symmetric [Normal Affect] : the affect was normal [Normal Insight/Judgement] : insight and judgment were intact [Normal Voice/Communication] : normal voice/communication [Normal TMs] : both tympanic membranes were normal [Normal Nasal Mucosa] : the nasal mucosa was normal [Normal Supraclavicular Nodes] : no supraclavicular lymphadenopathy [Acne] : no acne [Speech Grossly Normal] : speech grossly normal [Memory Grossly Normal] : memory grossly normal [Alert and Oriented x3] : oriented to person, place, and time [Normal Mood] : the mood was normal [de-identified] : done by GYN (Dr. Lopresti) [de-identified] : obese [FreeTextEntry1] : to see GI (Dr. Coronado) next month

## 2022-08-28 NOTE — HEALTH RISK ASSESSMENT
[Never] : Never [Yes] : Yes [Monthly or less (1 pt)] : Monthly or less (1 point) [1 or 2 (0 pts)] : 1 or 2 (0 points) [Never (0 pts)] : Never (0 points) [No] : In the past 12 months have you used drugs other than those required for medical reasons? No [0] : 2) Feeling down, depressed, or hopeless: Not at all (0) [PHQ-2 Negative - No further assessment needed] : PHQ-2 Negative - No further assessment needed [Any fall with injury in past year] : Patient reported fall with injury in the past year [Audit-CScore] : 1 [de-identified] : Regular. [MRD2Oqqeq] : 0 [MammogramDate] : 02/20 [MammogramComments] : BI-RADS 2: Benign findings; Results of most recent breast imaging to be requested from [facility]. [PapSmearDate] : 03/20 [PapSmearComments] : NILM.  [ColonoscopyComments] : Scheduled for November 2022 with Dr. Coronado.

## 2022-08-28 NOTE — PLAN
[FreeTextEntry1] : Ophthalmology\par Advised to follow up with ophthalmologist, Dr. Tomlinson for annual diabetic eye exam; results to be requested\par ENT/Immunology\par allergies/post-nasal drip - recommended starting non-sedating antihistamine (OTC Claritin or Allegra) - follow up with ENT specialist, Dr. Ladd as necessary \par Cardiology\par essential hypertension - check EKG (results as above) - continue Losartan Potassium 25mg p.o.q.d. as directed - continue low sodium diet; will continue to monitor BP\par history of hyperlipidemia - continue Rosuvastatin Calcium 20mg p.o.q.d. as directed - continue low cholesterol/low fat diet\par hypertriglyceridemia - continue low cholesterol/low fat and low carbohydrate/low sugar diet \par Hx of DM2; FHx of heart disease - Rx given for CT Heart Calcium Score-further recommendations pending results\par Endocrinology\par obesity - continue low carbohydrate/low sugar diet \par diabetes - continue Janumet  BID p.o.q.d. as directed; continue Rosuvastatin Calcium 20mg p.o.q.d. as directed; continue Losartan Potassium 25mg p.o.q.d. as directed - continue low carbohydrate/low sugar diet \par hypothyroidism (s/p thyroidectomy secondary to papillary thyroid carcinoma) - continue Levothyroxine Sodium 200mcg p.o.q.d. as directed\par Continue to follow up with endocrinologist, Dr. Atwood\par Gastroenterolgy\par Screening colonoscopy scheduled for November 2022 with gastroenterologist, Dr. Coronado\par Gynecology\par Patient to see GYN, Dr. Lopresti in September 2022 for her annual visit; results of pap smear to be requested Results of most recent breast imaging to be requested from [facility]\par Musculoskeletal\par back pain/rib pain - if pain persists/worsens, recommended imaging (patient currently refusing X-Ray RX) \par Immunization\par RTO in October 2022 for flu vaccine administration \par Tdap (Adacel) Vaccine - discussed, patient to consider and follow up for vaccine administration if interested\par Shingrix - discussed, patient to determine if vaccine is covered under medical benefits of current insurance plan and follow up for 1st dose if interested; otherwise, instructed to follow up at the pharmacy for vaccine administration\par \par check EKG (results as above)\par Results of recent lab work from Dr. Atwood's office reviewed and staff to scan to EMR

## 2022-08-28 NOTE — HISTORY OF PRESENT ILLNESS
[FreeTextEntry1] : annual wellness visit [de-identified] : Patient is a 56 year old female with a past medical history as below who presents for an annual wellness visit. Patient states she is taking all medications as prescribed and denies any adverse reactions or side effects. She denies lightheadedness or dizziness on Losartan Potassium. She denies heat or cold intolerance on Synthroid (Levothyroxine Sodium). GERD has been well-managed on Omeprazole. Patient has not seen GYN, Dr. Lopresti in the past year for her annual visit. Her last breast imaging was in 2022 at [facility]. Screening colonoscopy is scheduled for November 2022 with gastroenterologist, Dr. Coronado. Patient has not seen ophthalmologist, Dr. Tomlinson in the past year for her diabetic eye exam. Patient receives the flu vaccine annually. She received the Pneumovax 23 on 10/5/20. She is unsure if she has received the Tetanus Vaccine in the past 10 years. She has not received the Shingles (Shingrix) Vaccine. She is vaccinated against COVID-19 (3 doses). \par \par Patient sees endocrinologist, Dr. Atwood given history of DM2 and hypothyroidism (papillary thyroid carcinoma, s/p thyroidectomy). She is on Janumet  BID; off Metformin. She notes recently having blood work done at Dr. Atwood's office. \par \par Patient has never had a cardiac Stress Test or evaluation of her coronary arteries.\par \par Patient notes recently seeing ENT specialist, Dr. Ladd regarding allergy symptoms/post-nasal drip. Dr. Ladd recommended Mucinex and Nasal Spray.\par \par Patient notes back pain/rib pain secondary to recent fall; improving.

## 2022-08-28 NOTE — REVIEW OF SYSTEMS
[Negative] : Heme/Lymph [Postnasal Drip] : postnasal drip [Back Pain] : back pain [FreeTextEntry9] : rib pain

## 2022-09-07 ENCOUNTER — RX RENEWAL (OUTPATIENT)
Age: 57
End: 2022-09-07

## 2022-11-11 ENCOUNTER — NON-APPOINTMENT (OUTPATIENT)
Age: 57
End: 2022-11-11

## 2022-11-11 ENCOUNTER — APPOINTMENT (OUTPATIENT)
Dept: OTOLARYNGOLOGY | Facility: CLINIC | Age: 57
End: 2022-11-11

## 2022-11-11 VITALS
HEART RATE: 80 BPM | BODY MASS INDEX: 38.98 KG/M2 | DIASTOLIC BLOOD PRESSURE: 78 MMHG | WEIGHT: 220 LBS | HEIGHT: 63 IN | SYSTOLIC BLOOD PRESSURE: 120 MMHG

## 2022-11-11 PROCEDURE — 31231 NASAL ENDOSCOPY DX: CPT

## 2022-11-11 PROCEDURE — 99204 OFFICE O/P NEW MOD 45 MIN: CPT | Mod: 25

## 2022-11-11 NOTE — PHYSICAL EXAM
[] : septum deviated bilaterally [Normal] : mucosa is normal [Midline] : trachea located in midline position [FreeTextEntry8] : cerumen present  [de-identified] : tube in TM [de-identified] : crowded airway [de-identified] : class 2 [de-identified] : type 3 oral cavity  [FreeTextEntry2] : facial asymmetry left cheek larger then right

## 2022-11-11 NOTE — CONSULT LETTER
[Dear  ___] : Dear  [unfilled], [Consult Letter:] : I had the pleasure of evaluating your patient, [unfilled]. [Please see my note below.] : Please see my note below. [Consult Closing:] : Thank you very much for allowing me to participate in the care of this patient.  If you have any questions, please do not hesitate to contact me. [Sincerely,] : Sincerely, [FreeTextEntry3] : Rob Villar MD FACS

## 2022-11-11 NOTE — REVIEW OF SYSTEMS
[Sneezing] : sneezing [Seasonal Allergies] : seasonal allergies [Post Nasal Drip] : post nasal drip [Ear Pain] : ear pain [Ear Itch] : ear itch [Nasal Congestion] : nasal congestion [Sinus Pain] : sinus pain [Sinus Pressure] : sinus pressure [Throat Clearing] : throat clearing [Negative] : Heme/Lymph

## 2022-11-11 NOTE — HISTORY OF PRESENT ILLNESS
[de-identified] : The patient presents with h/o right TM tube. The patient presents today for sinuses. Pt reports always have chronic sinus infections and now she has a constant PND with mucus in throat. Pt is having throat congestion. Pt reports having allergies but she never had allergy testing done. Pt was using astelin but it was making PND more thicker and wasn’t helping. Pt denies using sinus rinse or flonase or taking any pills. Pt doesn’t know how long she has had the tube in right ear. Pt reports having sleep apnea and she was using CPAP but now she stopped.

## 2022-11-11 NOTE — ADDENDUM
[FreeTextEntry1] : Documented by Harinder Hernandez acting as scribe for Dr. Villar on 11/11/2022. \par \par All Medical record entries made by the scribe were at my. Dr. Villar direction and personally dictated by me on 11/11/2022. I have reviewed the chart and agree that the record accurately reflects my personal performance of the history, physical exam, assessment and plan. I have also personally directed, reviewed, and agreed with the discharge instructions.

## 2022-11-11 NOTE — PROCEDURE
[None] : none [FreeTextEntry6] : Procedure: Flexible Nasal Endoscopy: Risks, benefits, and alternatives of flexible endoscopy were explained to the patient. The patient gave oral consent to proceed. The flexible scope was inserted into the right nasal cavity. Endoscopy of the inferior and middle meatus was performed. No polyp, mass, or lesion was appreciated. Olfactory cleft was clear. right side: deviated septum, cant go further, left side: deviated septum, swollen turb, polyp in left middle meatus, polyps posteriorly, thick mucoid secretions. The procedure was repeated on the contralateral side with similar findings.  [de-identified] : PND, mucus, nasal congestion

## 2022-11-11 NOTE — ASSESSMENT
[FreeTextEntry1] : Reviewed and reconciled medications, allergies, PMHx, PSHx, SocHx, FMHx. \par \par The patient presents today for sinuses\par \par Physcial exam - \par facial asymmetry left cheek larger then right \par right TM tube \par deviated septum b/l \par crowded airway\par \par Flexible nasal endoscopy - \par right side: deviated septum, cant go further, left side: deviated septum, swollen turb, polyp in left middle meatus, polyps posteriorly, thick mucoid secretions \par \par Plan:\par Flexible nasal endoscopy. cant use xhance since her insurance isnt covering, she doesn’t like sinus rinse so we limited to flonase and saline spray for now. sleep study and CT sinus ordered. FU after CT

## 2022-12-07 ENCOUNTER — RX RENEWAL (OUTPATIENT)
Age: 57
End: 2022-12-07

## 2022-12-27 ENCOUNTER — APPOINTMENT (OUTPATIENT)
Dept: SLEEP CENTER | Facility: CLINIC | Age: 57
End: 2022-12-27

## 2022-12-28 ENCOUNTER — APPOINTMENT (OUTPATIENT)
Dept: OTOLARYNGOLOGY | Facility: CLINIC | Age: 57
End: 2022-12-28

## 2022-12-28 VITALS
DIASTOLIC BLOOD PRESSURE: 72 MMHG | SYSTOLIC BLOOD PRESSURE: 110 MMHG | HEART RATE: 81 BPM | WEIGHT: 215 LBS | HEIGHT: 63 IN | BODY MASS INDEX: 38.09 KG/M2

## 2022-12-28 PROCEDURE — 99214 OFFICE O/P EST MOD 30 MIN: CPT

## 2022-12-28 NOTE — HISTORY OF PRESENT ILLNESS
[de-identified] : Pt presents with h/o right TM tube, diabetes presents today for CT results. Pt notes she is still very congested. Pt notes she has been using the Flonase. Pt notes she doesn't think the saline doesn't help her much. Pt notes she has a lot of thick mucus. Pt notes she has had allergy testing done: a lot of environmental allergies and she is allergic to cats and she has pet cats.

## 2022-12-28 NOTE — ADDENDUM
[FreeTextEntry1] : Documented by Halle Nieves acting as scribe for Dr. Villar on 12/28/2022.\par \par All Medical record entries made by the scribe were at my, Dr. Villar,direction and personally dictated by me on 12/28/2022. I have reviewed the chart and agree that the record accurately reflects my personal performance of the history, physical exam, assessment and plan. I have also personally directed, reviewed, and agreed with the discharge instructions.

## 2022-12-28 NOTE — CONSULT LETTER
[Dear  ___] : Dear  [unfilled], [Courtesy Letter:] : I had the pleasure of seeing your patient, [unfilled], in my office today. [Please see my note below.] : Please see my note below. [Consult Closing:] : Thank you very much for allowing me to participate in the care of this patient.  If you have any questions, please do not hesitate to contact me. [Sincerely,] : Sincerely, [FreeTextEntry3] : Rob Villar MD FACS

## 2022-12-28 NOTE — ASSESSMENT
[FreeTextEntry1] : Reviewed and reconciled medications, allergies, PMHx, PSHx, SocHx, FMHx.\par \par h/o right TM tube, diabetes presents today for CT results. Pt notes she is still very congested. \par \par CT Sinus 11/25/22\par thickening right cheek sinus, mild thickening left cheek sinus, drain pathways blocked b/l\par left osiel bullosa\par frontal thickening and drain pathways blocked right worse than left\par thickening in ethmoids and sphenoids\par deviated septum touches side walls\par \par Plan:\par Discussed CT sinus. Discussed r/b/a of sinus surgery. More the 50% of time was spent counseling the patient. Sinus rinse - 1 bottle per day. Astelin - 2 sprays bilaterally BID, spray laterally. Flonase - 2 sprays bilaterally QD, spray laterally. Mucinex and water for the mucus in the throat. FU for surgery.

## 2023-01-03 ENCOUNTER — APPOINTMENT (OUTPATIENT)
Dept: OTOLARYNGOLOGY | Facility: CLINIC | Age: 58
End: 2023-01-03
Payer: COMMERCIAL

## 2023-01-03 VITALS
BODY MASS INDEX: 44.3 KG/M2 | SYSTOLIC BLOOD PRESSURE: 127 MMHG | HEIGHT: 63 IN | DIASTOLIC BLOOD PRESSURE: 84 MMHG | HEART RATE: 108 BPM | WEIGHT: 250 LBS

## 2023-01-03 DIAGNOSIS — J01.90 ACUTE SINUSITIS, UNSPECIFIED: ICD-10-CM

## 2023-01-03 PROCEDURE — 99214 OFFICE O/P EST MOD 30 MIN: CPT

## 2023-01-03 RX ORDER — IPRATROPIUM BROMIDE 42 UG/1
0.06 SPRAY NASAL
Qty: 15 | Refills: 0 | Status: DISCONTINUED | COMMUNITY
Start: 2022-07-29 | End: 2023-01-03

## 2023-01-03 RX ORDER — MOMETASONE FUROATE 1 MG/G
0.1 CREAM TOPICAL
Qty: 45 | Refills: 0 | Status: DISCONTINUED | COMMUNITY
Start: 2022-07-22 | End: 2023-01-03

## 2023-01-03 RX ORDER — KETOCONAZOLE 20 MG/G
2 CREAM TOPICAL
Qty: 60 | Refills: 0 | Status: DISCONTINUED | COMMUNITY
Start: 2022-07-22 | End: 2023-01-03

## 2023-01-03 RX ORDER — MUPIROCIN 20 MG/G
2 OINTMENT TOPICAL
Qty: 22 | Refills: 0 | Status: DISCONTINUED | COMMUNITY
Start: 2022-06-23 | End: 2023-01-03

## 2023-01-03 RX ORDER — CHLORHEXIDINE GLUCONATE 4 %
1000 LIQUID (ML) TOPICAL DAILY
Qty: 30 | Refills: 0 | Status: DISCONTINUED | COMMUNITY
Start: 2021-10-12 | End: 2023-01-03

## 2023-01-03 NOTE — HISTORY OF PRESENT ILLNESS
[de-identified] : 57 yr old female advised by Dr Villar to have PNS surgery for polyps and sinus inflammatory changes, not yet scheduled\par \par c/o lots of discolored mucous, left ear pain since yesterday\par -fever\par +known tube AD\par +flonase, azelastine qd\par unable to imelda sinus rinse\par \par +hx sinusitis, but not in about 2 years

## 2023-01-03 NOTE — PHYSICAL EXAM
[] : septum deviated bilaterally [Normal] : lingual tonsils are normal [Midline] : trachea located in midline position [de-identified] : tube AD, retracted AS [de-identified] : erythema  [de-identified] : yellow mucous in OP

## 2023-01-03 NOTE — ASSESSMENT
[FreeTextEntry1] : continue flonase and azelastine\par Afrin for 3d\par rx biaxin (hold statin)\par \par advised her to test for Covid and call with results\par \par f/u Dr Bledsoe re:surgery

## 2023-01-23 NOTE — ED PROVIDER NOTE - PROVIDER TOKENS
Loading dose and mat dose were sent to pharmacy.   Will contact them to confirm they have prescription on file.    Patient picked up loading dose today 2ml from pharmacy, spoke with pharmacist speedy to clarify.    They request a new prescription for the 1ml routine dose to be sent. Resent prescription to them for 1ml   
PROVIDER:[TOKEN:[[id number]]]

## 2023-03-13 ENCOUNTER — RX RENEWAL (OUTPATIENT)
Age: 58
End: 2023-03-13

## 2023-04-05 ENCOUNTER — APPOINTMENT (OUTPATIENT)
Dept: OTOLARYNGOLOGY | Facility: CLINIC | Age: 58
End: 2023-04-05
Payer: COMMERCIAL

## 2023-04-05 VITALS — HEIGHT: 63 IN | DIASTOLIC BLOOD PRESSURE: 77 MMHG | HEART RATE: 84 BPM | SYSTOLIC BLOOD PRESSURE: 119 MMHG

## 2023-04-05 DIAGNOSIS — G47.33 OBSTRUCTIVE SLEEP APNEA (ADULT) (PEDIATRIC): ICD-10-CM

## 2023-04-05 DIAGNOSIS — C73 MALIGNANT NEOPLASM OF THYROID GLAND: ICD-10-CM

## 2023-04-05 PROCEDURE — 31231 NASAL ENDOSCOPY DX: CPT

## 2023-04-05 PROCEDURE — 99214 OFFICE O/P EST MOD 30 MIN: CPT | Mod: 25

## 2023-04-05 NOTE — REASON FOR VISIT
[Subsequent Evaluation] : a subsequent evaluation for [FreeTextEntry2] : chronic sinuses-feels fluid in ears, is having sinus pressure, feels off balance

## 2023-04-05 NOTE — PHYSICAL EXAM
[] : septum deviated to the left [Normal] : mucosa is normal [Midline] : trachea located in midline position [de-identified] : tube in place [de-identified] : clear mucus on the right [de-identified] : class 1, cyst right tonsil [de-identified] : dry mouth [FreeTextEntry2] : left cheek ojeda than right, asymmetric

## 2023-04-05 NOTE — ADDENDUM
[FreeTextEntry1] : Documented by Halle Nieves acting as scribe for Dr. Villar on 04/05/2023.\par \par All Medical record entries made by the scribe were at my, Dr. Villar,direction and personally dictated by me on 04/05/2023. I have reviewed the chart and agree that the record accurately reflects my personal performance of the history, physical exam, assessment and plan. I have also personally directed, reviewed, and agreed with the discharge instructions.

## 2023-04-05 NOTE — ASSESSMENT
[FreeTextEntry1] : Reviewed and reconciled medications, allergies, PMHx, PSHx, SocHx, FMHx.\par \par Pt presents with h/o right TM tube, diabetes presents today c/o sinus pressure, feeling stuffy, and off balance. \par \par Physical Exam -\par right ear tube in place\par deviated septum left, clear discharge right\par tonsils class 1, small cyst right tonsil\par face is asymmetric- left cheek ojeda than right\par \par CT Sinus 11/25/22\par thickening right cheek sinus, mild thickening left cheek sinus, drain pathways blocked b/l\par left osiel bullosa\par frontal thickening and drain pathways blocked right worse than left\par thickening in ethmoids and sphenoids\par deviated septum touches side walls\par \par Flexible nasal endoscopy \par left: polyps in middle meatus, no obvious purulent discharge\par right: very blocked by deviated septum, purulent polyps and purulent discharge\par \par Plan: \par Flexible nasal endoscopy. Discussed previous CT sinus. Use Saline spray. Flonase - 2 sprays bilaterally QD, spray laterally. Discussed r/b/a of septum, polyps, and sinus surgery - pt needs to see when would be a good time with schedule. Zithromax - 1 pill BID for 7 days. FU prn

## 2023-04-05 NOTE — PROCEDURE
[Recalcitrant Symptoms] : recalcitrant symptoms  [None] : none [Flexible Endoscope] : examined with the flexible endoscope [FreeTextEntry6] : Procedure: Flexible Nasal Endoscopy: Risks, benefits, and alternatives of flexible endoscopy were explained to the patient. The patient gave oral consent to proceed. The flexible scope was inserted into the right nasal cavity. Endoscopy of the inferior and middle meatus was performed. Nose is very blocked by deviated septum. Polyp in middle meatus and purulent discharge. Olfactory cleft was clear. Spheno-ethmoid recess is clear. Nasopharynx was clear. Turbinates were without mass. The procedure was repeated on the contralateral side with polyps in middle meatus, no obvious purulent discharge.  [de-identified] : check for polyps

## 2023-04-05 NOTE — HISTORY OF PRESENT ILLNESS
[de-identified] : Pt presents with h/o right TM tube, diabetes presents today c/o sinus pressure, feeling stuffy, and off balance. Pt notes she still use Flonase. Notes she doesn't use saline because it doesn't seem to help. Pt denies using Azelastine. Pt notes she still has constant congestion.

## 2023-06-26 ENCOUNTER — RX RENEWAL (OUTPATIENT)
Age: 58
End: 2023-06-26

## 2023-06-29 ENCOUNTER — APPOINTMENT (OUTPATIENT)
Dept: OTOLARYNGOLOGY | Facility: CLINIC | Age: 58
End: 2023-06-29
Payer: COMMERCIAL

## 2023-06-29 VITALS
WEIGHT: 220 LBS | HEART RATE: 82 BPM | BODY MASS INDEX: 38.98 KG/M2 | HEIGHT: 63 IN | SYSTOLIC BLOOD PRESSURE: 123 MMHG | DIASTOLIC BLOOD PRESSURE: 77 MMHG

## 2023-06-29 DIAGNOSIS — J32.9 CHRONIC SINUSITIS, UNSPECIFIED: ICD-10-CM

## 2023-06-29 PROCEDURE — 99213 OFFICE O/P EST LOW 20 MIN: CPT

## 2023-06-29 RX ORDER — AZELASTINE HYDROCHLORIDE 137 UG/1
0.1 SPRAY, METERED NASAL
Qty: 60 | Refills: 0 | Status: DISCONTINUED | COMMUNITY
Start: 2020-08-11 | End: 2023-06-29

## 2023-06-29 RX ORDER — SITAGLIPTIN AND METFORMIN HYDROCHLORIDE 50; 1000 MG/1; MG/1
50-1000 TABLET, FILM COATED ORAL TWICE DAILY
Refills: 0 | Status: DISCONTINUED | COMMUNITY
End: 2023-06-29

## 2023-06-29 RX ORDER — SEMAGLUTIDE 1.34 MG/ML
2 INJECTION, SOLUTION SUBCUTANEOUS
Refills: 0 | Status: ACTIVE | COMMUNITY

## 2023-06-29 RX ORDER — FAMOTIDINE 40 MG/1
TABLET, FILM COATED ORAL
Refills: 0 | Status: DISCONTINUED | COMMUNITY
End: 2023-06-29

## 2023-06-29 RX ORDER — AZITHROMYCIN 500 MG/1
500 TABLET, FILM COATED ORAL DAILY
Qty: 7 | Refills: 0 | Status: DISCONTINUED | COMMUNITY
Start: 2023-04-05 | End: 2023-06-29

## 2023-06-29 NOTE — ASSESSMENT
[FreeTextEntry1] : +sinusitis and nasal polyps\par discussed importance of having PNS surgery\par \par rx zithromax, Azelastine\par f/u france Villar

## 2023-06-29 NOTE — HISTORY OF PRESENT ILLNESS
[de-identified] : 57 yr old female c/o flare up of her nasal congestion, lots of mucous, mostly clear, sl yellow, thumping AS, cheek pain\par +flonase\par stopped Azelastine\par unable to imelda sinus rinse\par +hx sinusitis most recently in April 2023\par \par Dr Villar has recommended PNS surgery for polyps and sinusitis, not yet scheduled

## 2023-06-29 NOTE — PHYSICAL EXAM
[] : septum deviated bilaterally [Normal] : mucosa is normal [Midline] : trachea located in midline position [de-identified] : TIPP AD, clear AS [de-identified] : enlarged, bilat polyps

## 2023-07-14 ENCOUNTER — APPOINTMENT (OUTPATIENT)
Dept: OTOLARYNGOLOGY | Facility: CLINIC | Age: 58
End: 2023-07-14
Payer: COMMERCIAL

## 2023-07-14 VITALS
DIASTOLIC BLOOD PRESSURE: 68 MMHG | HEIGHT: 63 IN | BODY MASS INDEX: 38.98 KG/M2 | SYSTOLIC BLOOD PRESSURE: 110 MMHG | HEART RATE: 78 BPM | WEIGHT: 220 LBS

## 2023-07-14 DIAGNOSIS — J31.0 CHRONIC RHINITIS: ICD-10-CM

## 2023-07-14 PROCEDURE — 99214 OFFICE O/P EST MOD 30 MIN: CPT

## 2023-07-14 NOTE — PHYSICAL EXAM
[] : septum deviated to the left [Normal] : mucosa is normal [Midline] : trachea located in midline position [de-identified] : CANALS IRRITATED/ LEFT MORE THAN RIGHT [de-identified] : NASAL MUCOSAL CONGESTION

## 2023-07-14 NOTE — HISTORY OF PRESENT ILLNESS
[de-identified] : LEFT EAR HURTING FOR THE PAST 2 DAYS\par HX OF CHRONIC SINUSITIS\par MEDICAL HX REVIEWED

## 2023-07-21 ENCOUNTER — APPOINTMENT (OUTPATIENT)
Dept: OTOLARYNGOLOGY | Facility: CLINIC | Age: 58
End: 2023-07-21
Payer: COMMERCIAL

## 2023-07-21 VITALS
HEART RATE: 69 BPM | HEIGHT: 63 IN | SYSTOLIC BLOOD PRESSURE: 132 MMHG | BODY MASS INDEX: 38.27 KG/M2 | DIASTOLIC BLOOD PRESSURE: 76 MMHG | WEIGHT: 216 LBS

## 2023-07-21 DIAGNOSIS — H93.8X2 OTHER SPECIFIED DISORDERS OF LEFT EAR: ICD-10-CM

## 2023-07-21 DIAGNOSIS — H60.90 UNSPECIFIED OTITIS EXTERNA, UNSPECIFIED EAR: ICD-10-CM

## 2023-07-21 PROCEDURE — 99214 OFFICE O/P EST MOD 30 MIN: CPT | Mod: 25

## 2023-07-21 PROCEDURE — 31231 NASAL ENDOSCOPY DX: CPT

## 2023-07-21 PROCEDURE — 92557 COMPREHENSIVE HEARING TEST: CPT

## 2023-07-21 PROCEDURE — 92567 TYMPANOMETRY: CPT

## 2023-07-21 NOTE — DATA REVIEWED
[de-identified] : \par -TYMPS: TYPE B AD, TYPE A (ETF ABNORMAL) AS\par -AD: ESSENTIALLY MILD -8000 HZ\par -AS: HEARING ESSENTIALLY -6000 HZ TO A MILD HF HL AT 8000 HZ (SLIGHT CHL NOTED AT 4KHZ AS)

## 2023-07-21 NOTE — PROCEDURE
[FreeTextEntry6] : Procedure:  Flexible Nasal Endoscopy: Risks, benefits, and alternatives of flexible endoscopy were explained to the patient. The patient gave oral consent to proceed.  The flexible scope was inserted into the right nasal cavity.  Endoscopy of the inferior and middle meatus was performed. \par -left: polyps in middle meatus, some purulent discharge. \par right- deviated septum, polyps

## 2023-07-21 NOTE — HISTORY OF PRESENT ILLNESS
[de-identified] : Pt presents with h/o right TM tube presents to office complaining of bilateral clogged ear sensation, sinus pressure, nasal congestion for over 1 week. Patient previously seen last week by Dr. Peacock for ear infection where she was given ear drops. Patient states she feels like she is sounding more nasal than she normally does. She knows she has nasal polyps not currently on any medications.

## 2023-07-21 NOTE — CONSULT LETTER
[Dear  ___] : Dear  [unfilled], [Courtesy Letter:] : I had the pleasure of seeing your patient, [unfilled], in my office today. [Please see my note below.] : Please see my note below. [Referral Closing:] : Thank you very much for seeing this patient.  If you have any questions, please do not hesitate to contact me. [Sincerely,] : Sincerely, [FreeTextEntry3] : Ralph Rodriguez PA-C\par

## 2023-07-21 NOTE — PHYSICAL EXAM
[] : septum deviated to the right [Midline] : trachea located in midline position [Normal] : no rashes [Hearing Loss Right Only] : normal [Hearing Loss Left Only] : normal [FreeTextEntry8] : tube in ear [de-identified] : retracted ear drum [de-identified] : inflamed turbinates [de-identified] : cyst on tonsil

## 2023-08-02 ENCOUNTER — APPOINTMENT (OUTPATIENT)
Dept: OTOLARYNGOLOGY | Facility: CLINIC | Age: 58
End: 2023-08-02

## 2023-08-29 ENCOUNTER — NON-APPOINTMENT (OUTPATIENT)
Age: 58
End: 2023-08-29

## 2023-08-29 ENCOUNTER — APPOINTMENT (OUTPATIENT)
Dept: INTERNAL MEDICINE | Facility: CLINIC | Age: 58
End: 2023-08-29
Payer: COMMERCIAL

## 2023-08-29 VITALS
RESPIRATION RATE: 16 BRPM | DIASTOLIC BLOOD PRESSURE: 70 MMHG | SYSTOLIC BLOOD PRESSURE: 112 MMHG | HEART RATE: 70 BPM | TEMPERATURE: 97.2 F | HEIGHT: 63 IN | OXYGEN SATURATION: 97 % | BODY MASS INDEX: 37.63 KG/M2 | WEIGHT: 212.38 LBS

## 2023-08-29 DIAGNOSIS — Z13.31 ENCOUNTER FOR SCREENING FOR DEPRESSION: ICD-10-CM

## 2023-08-29 DIAGNOSIS — E66.9 OBESITY, UNSPECIFIED: ICD-10-CM

## 2023-08-29 DIAGNOSIS — Z00.00 ENCOUNTER FOR GENERAL ADULT MEDICAL EXAMINATION W/OUT ABNORMAL FINDINGS: ICD-10-CM

## 2023-08-29 DIAGNOSIS — E89.0 POSTPROCEDURAL HYPOTHYROIDISM: ICD-10-CM

## 2023-08-29 PROCEDURE — G0444 DEPRESSION SCREEN ANNUAL: CPT | Mod: 59

## 2023-08-29 PROCEDURE — 99396 PREV VISIT EST AGE 40-64: CPT | Mod: 25

## 2023-08-29 PROCEDURE — 36415 COLL VENOUS BLD VENIPUNCTURE: CPT

## 2023-08-29 PROCEDURE — 93000 ELECTROCARDIOGRAM COMPLETE: CPT | Mod: 59

## 2023-08-29 RX ORDER — NEOMYCIN AND POLYMYXIN B SULFATES AND HYDROCORTISONE OTIC 10; 3.5; 1 MG/ML; MG/ML; [USP'U]/ML
3.5-10000-1 SUSPENSION AURICULAR (OTIC)
Qty: 1 | Refills: 1 | Status: DISCONTINUED | COMMUNITY
Start: 2023-07-14 | End: 2023-08-29

## 2023-08-29 RX ORDER — AZITHROMYCIN 500 MG/1
500 TABLET, FILM COATED ORAL DAILY
Qty: 10 | Refills: 0 | Status: DISCONTINUED | COMMUNITY
Start: 2023-07-21 | End: 2023-08-29

## 2023-08-29 RX ORDER — AZELASTINE HYDROCHLORIDE 137 UG/1
0.1 SPRAY, METERED NASAL TWICE DAILY
Qty: 3 | Refills: 3 | Status: DISCONTINUED | COMMUNITY
Start: 2023-06-29 | End: 2023-08-29

## 2023-08-29 RX ORDER — METHYLPREDNISOLONE 4 MG/1
4 TABLET ORAL
Qty: 1 | Refills: 0 | Status: DISCONTINUED | COMMUNITY
Start: 2023-07-21 | End: 2023-08-29

## 2023-08-29 RX ORDER — AZITHROMYCIN 250 MG/1
250 TABLET, FILM COATED ORAL
Qty: 20 | Refills: 0 | Status: DISCONTINUED | COMMUNITY
Start: 2023-06-29 | End: 2023-08-29

## 2023-08-29 RX ORDER — AZELASTINE HYDROCHLORIDE AND FLUTICASONE PROPIONATE 137; 50 UG/1; UG/1
137-50 SPRAY, METERED NASAL
Qty: 3 | Refills: 3 | Status: DISCONTINUED | COMMUNITY
Start: 2023-07-21 | End: 2023-08-29

## 2023-08-29 NOTE — ASSESSMENT
[FreeTextEntry1] : Patient is a 57-year-old female with a past medical history as above who presents for an annual wellness visit.

## 2023-08-29 NOTE — PLAN
[FreeTextEntry1] : Ophthalmology Advised to follow up with ophthalmologist, Dr. Tomlinson for annual diabetic eye exam; results to be requested ENT/Immunology allergies/post-nasal drip - recommended starting non-sedating antihistamine (OTC Claritin or Allegra) - follow up with ENT specialist, Dr. Ladd as necessary  Cardiology essential hypertension - check EKG (results as above) - continue Losartan Potassium 25mg p.o.q.d. as directed - continue low sodium diet; will continue to monitor BP history of hyperlipidemia - continue Rosuvastatin Calcium 20mg p.o.q.d. as directed - continue low cholesterol/low fat diet hypertriglyceridemia - continue low cholesterol/low fat and low carbohydrate/low sugar diet  Hx of DM2; FHx of heart disease - Rx given for CT Heart Calcium Score-further recommendations pending results Endocrinology obesity - continue low carbohydrate/low sugar diet  diabetes - continue Ozempic 1 mg SC weekly as directed; continue Rosuvastatin Calcium 20mg p.o.q.d. as directed; continue Losartan Potassium 25mg p.o.q.d. as directed - continue low carbohydrate/low sugar diet  hypothyroidism (s/p thyroidectomy secondary to papillary thyroid carcinoma) - continue Levothyroxine Sodium 200mcg p.o.q.d. as directed Continue to follow up with endocrinologist, Dr. Atwood Gastroenterology Screening colonoscopy done November 2022 with gastroenterologist, Dr. Coronado Gynecology UTD with seeing GYN, Dr. Lopresti for her annual visit; results of pap smear to be requested Results of most recent breast imaging to be requested from [facility] in October 2023 for flu vaccine administration  Tdap (Adacel) Vaccine - discussed, patient to consider and follow up for vaccine administration if interested Shingrix - discussed, patient to determine if vaccine is covered under medical benefits of current insurance plan and follow up for 1st dose if interested; otherwise, instructed to follow up at the pharmacy for vaccine administration  check EKG (results as above) CBC, CMP, FLP, hemoglobin A1C, Albumin to Creatinine ratio, vitamin B12, vitamin D, full thyroid panel

## 2023-08-29 NOTE — HISTORY OF PRESENT ILLNESS
[FreeTextEntry1] : annual wellness examination [de-identified] : Patient is a 57-year-old female with a past medical history as below who presents for an annual wellness visit. Patient states she is taking all medications as prescribed and denies any adverse reactions or side effects. She denies lightheadedness or dizziness on Losartan Potassium. She denies heat or cold intolerance on Synthroid (Levothyroxine Sodium). GERD has been well-managed on Omeprazole. Patient has seen GYN, Dr. Lopresti in the past year for her annual visit. Her last breast imaging was done in the past year at [facility]. Screening colonoscopy was done November 2022 with gastroenterologist, Dr. Coronado. Patient has not seen ophthalmologist, Dr. Tomlinson in the past year for her diabetic eye exam. Patient receives the flu vaccine annually. She received the Pneumovax 23 on 10/5/20. She is unsure if she has received the Tetanus Vaccine in the past 10 years. She has not received the Shingles (Shingrix) Vaccine. She noted history of varicella infection. She is vaccinated against COVID-19 (3 doses).   Patient sees endocrinologist, Dr. Atwood, given history of DM2 and hypothyroidism (papillary thyroid carcinoma, s/p thyroidectomy). She had blood work done in July 2023 in Dr. Atwood's office. Patient has been taking Ozempic 1 MG weekly prescribed by Dr. Atwood for 5 months.

## 2023-08-29 NOTE — HEALTH RISK ASSESSMENT
[No] : In the past 12 months have you used drugs other than those required for medical reasons? No [0] : 2) Feeling down, depressed, or hopeless: Not at all (0) [PHQ-2 Negative - No further assessment needed] : PHQ-2 Negative - No further assessment needed [Never] : Never [Monthly or less (1 pt)] : Monthly or less (1 point) [1 or 2 (0 pts)] : 1 or 2 (0 points) [Never (0 pts)] : Never (0 points) [Audit-CScore] : 1 [[id number]] : 0

## 2023-08-29 NOTE — ADDENDUM
[FreeTextEntry1] : This note was written by Georgette Pantoja on 08/29/2023, acting as a scribe for Dr. Juni Vargas DO.  All medic record entries were at my, Dr. Juni Vargas DO , direction and personally dictated by me in 08/29/2023. I have personally reviewed the chart and agree that the record accurately reflects my personal performance of the history, physical exam, assessment, and plan.

## 2023-08-29 NOTE — PHYSICAL EXAM
[No Acute Distress] : no acute distress [Well Nourished] : well nourished [Well Developed] : well developed [Well-Appearing] : well-appearing [Normal Sclera/Conjunctiva] : normal sclera/conjunctiva [PERRL] : pupils equal round and reactive to light [EOMI] : extraocular movements intact [Normal Outer Ear/Nose] : the outer ears and nose were normal in appearance [Normal Oropharynx] : the oropharynx was normal [No JVD] : no jugular venous distention [No Lymphadenopathy] : no lymphadenopathy [Supple] : supple [Thyroid Normal, No Nodules] : the thyroid was normal and there were no nodules present [No Respiratory Distress] : no respiratory distress  [No Accessory Muscle Use] : no accessory muscle use [Clear to Auscultation] : lungs were clear to auscultation bilaterally [Normal Rate] : normal rate  [Regular Rhythm] : with a regular rhythm [Normal S1, S2] : normal S1 and S2 [No Murmur] : no murmur heard [No Carotid Bruits] : no carotid bruits [No Abdominal Bruit] : a ~M bruit was not heard ~T in the abdomen [No Varicosities] : no varicosities [Pedal Pulses Present] : the pedal pulses are present [No Edema] : there was no peripheral edema [No Palpable Aorta] : no palpable aorta [No Extremity Clubbing/Cyanosis] : no extremity clubbing/cyanosis [Soft] : abdomen soft [Non Tender] : non-tender [Non-distended] : non-distended [No Masses] : no abdominal mass palpated [No HSM] : no HSM [Normal Bowel Sounds] : normal bowel sounds [Normal Posterior Cervical Nodes] : no posterior cervical lymphadenopathy [Normal Anterior Cervical Nodes] : no anterior cervical lymphadenopathy [No CVA Tenderness] : no CVA  tenderness [No Spinal Tenderness] : no spinal tenderness [No Joint Swelling] : no joint swelling [Grossly Normal Strength/Tone] : grossly normal strength/tone [No Rash] : no rash [Coordination Grossly Intact] : coordination grossly intact [No Focal Deficits] : no focal deficits [Normal Gait] : normal gait [Deep Tendon Reflexes (DTR)] : deep tendon reflexes were 2+ and symmetric [Normal Affect] : the affect was normal [Normal Insight/Judgement] : insight and judgment were intact [Normal Voice/Communication] : normal voice/communication [Normal TMs] : both tympanic membranes were normal [Normal Nasal Mucosa] : the nasal mucosa was normal [Normal Supraclavicular Nodes] : no supraclavicular lymphadenopathy [Kyphosis] : no kyphosis [Scoliosis] : no scoliosis [Acne] : no acne [Speech Grossly Normal] : speech grossly normal [Memory Grossly Normal] : memory grossly normal [Alert and Oriented x3] : oriented to person, place, and time [Normal Mood] : the mood was normal [de-identified] : done by GYN [FreeTextEntry1] : done by GI

## 2023-09-04 LAB
25(OH)D3 SERPL-MCNC: 35 NG/ML
ALBUMIN SERPL ELPH-MCNC: 4.7 G/DL
ALP BLD-CCNC: 125 U/L
ALT SERPL-CCNC: 11 U/L
ANION GAP SERPL CALC-SCNC: 15 MMOL/L
AST SERPL-CCNC: 17 U/L
BILIRUB SERPL-MCNC: 0.3 MG/DL
BUN SERPL-MCNC: 18 MG/DL
CALCIUM SERPL-MCNC: 9.6 MG/DL
CHLORIDE SERPL-SCNC: 103 MMOL/L
CHOLEST SERPL-MCNC: 199 MG/DL
CO2 SERPL-SCNC: 26 MMOL/L
CREAT SERPL-MCNC: 0.71 MG/DL
CREAT SPEC-SCNC: 104 MG/DL
EGFR: 100 ML/MIN/1.73M2
ESTIMATED AVERAGE GLUCOSE: 157 MG/DL
GLUCOSE SERPL-MCNC: 118 MG/DL
HBA1C MFR BLD HPLC: 7.1 %
HDLC SERPL-MCNC: 60 MG/DL
LDLC SERPL CALC-MCNC: 110 MG/DL
MICROALBUMIN 24H UR DL<=1MG/L-MCNC: <1.2 MG/DL
MICROALBUMIN/CREAT 24H UR-RTO: NORMAL MG/G
NONHDLC SERPL-MCNC: 138 MG/DL
POTASSIUM SERPL-SCNC: 4.4 MMOL/L
PROT SERPL-MCNC: 7.1 G/DL
SODIUM SERPL-SCNC: 143 MMOL/L
T3FREE SERPL-MCNC: 2.61 PG/ML
T4 FREE SERPL-MCNC: 1.8 NG/DL
THYROGLOB AB SERPL-ACNC: <20 IU/ML
THYROPEROXIDASE AB SERPL IA-ACNC: <10 IU/ML
TRIGL SERPL-MCNC: 162 MG/DL
TSH SERPL-ACNC: 1.75 UIU/ML
VIT B12 SERPL-MCNC: 455 PG/ML

## 2023-09-15 ENCOUNTER — APPOINTMENT (OUTPATIENT)
Dept: OTOLARYNGOLOGY | Facility: CLINIC | Age: 58
End: 2023-09-15
Payer: COMMERCIAL

## 2023-09-15 VITALS
BODY MASS INDEX: 38.09 KG/M2 | DIASTOLIC BLOOD PRESSURE: 84 MMHG | HEIGHT: 63 IN | SYSTOLIC BLOOD PRESSURE: 143 MMHG | HEART RATE: 92 BPM | WEIGHT: 215 LBS

## 2023-09-15 PROCEDURE — 99213 OFFICE O/P EST LOW 20 MIN: CPT

## 2023-09-22 LAB — EAR NOSE AND THROAT CULTURE: ABNORMAL

## 2023-09-25 ENCOUNTER — RX RENEWAL (OUTPATIENT)
Age: 58
End: 2023-09-25

## 2023-10-27 ENCOUNTER — APPOINTMENT (OUTPATIENT)
Dept: OTOLARYNGOLOGY | Facility: CLINIC | Age: 58
End: 2023-10-27
Payer: COMMERCIAL

## 2023-10-27 VITALS
HEIGHT: 63 IN | DIASTOLIC BLOOD PRESSURE: 80 MMHG | HEART RATE: 74 BPM | SYSTOLIC BLOOD PRESSURE: 122 MMHG | WEIGHT: 210 LBS | BODY MASS INDEX: 37.21 KG/M2

## 2023-10-27 DIAGNOSIS — J33.9 NASAL POLYP, UNSPECIFIED: ICD-10-CM

## 2023-10-27 DIAGNOSIS — J01.90 ACUTE SINUSITIS, UNSPECIFIED: ICD-10-CM

## 2023-10-27 PROCEDURE — 99213 OFFICE O/P EST LOW 20 MIN: CPT

## 2023-10-31 NOTE — PAST MEDICAL HISTORY
Department of Anesthesiology  Preprocedure Note       Name:  Do Correia   Age:  68 y.o.  :  1949                                          MRN:  [id number]         Date:  10/31/2023      Surgeon: Olayinka Hebert):  Jennifer Castellano MD    Procedure: Procedure(s):  COLONOSCOPY DIAGNOSTIC  EGD ESOPHAGOGASTRODUODENOSCOPY    Medications prior to admission:   Prior to Admission medications    Medication Sig Start Date End Date Taking? Authorizing Provider   ascorbic acid (VITAMIN C) 250 MG tablet Take 2 tablets by mouth daily    Rae Bone MD   turmeric (QC TUMERIC COMPLEX) 500 MG CAPS Take 1 capsule by mouth daily    Rae Bone MD   Magnesium 400 MG TABS Take 400 mg by mouth daily    Rae Bone MD   vitamin D (CHOLECALCIFEROL) 50 MCG (2000) CAPS capsule 1 capsule daily    Rae Bone MD   acetaminophen (TYLENOL) 500 MG tablet Take 1 tablet by mouth every 6 hours as needed for Pain    ProviderRae MD       Current medications:    Current Facility-Administered Medications   Medication Dose Route Frequency Provider Last Rate Last Admin    sodium chloride flush 0.9 % injection 5-40 mL  5-40 mL IntraVENous 2 times per day Gladys Pleitez MD        sodium chloride flush 0.9 % injection 5-40 mL  5-40 mL IntraVENous PRN Gladys Pleitez MD        0.9 % sodium chloride infusion   IntraVENous PRN Gladys Pleitez MD           Allergies: Allergies   Allergen Reactions    Ciprofloxacin Anaphylaxis    Oxycodone-Acetaminophen Shortness Of Breath     Confusion, slurring speech    Influenza A (H1n1) Monovalent Vaccine     Risedronate      Chest pain   Chest pain       Sulfa Antibiotics Swelling     In eyes       Problem List:  There is no problem list on file for this patient. Past Medical History:        Diagnosis Date    Age related osteoporosis     Colon cancer ([address])     in 20's.   bowel resection, radiation no chemo    Fibromyalgia     GERD (gastroesophageal [Perimenopausal] : hx of being perimenopausal [FreeTextEntry1] : spotting early 1/20

## 2023-11-02 LAB — EAR NOSE AND THROAT CULTURE: ABNORMAL

## 2023-12-28 ENCOUNTER — APPOINTMENT (OUTPATIENT)
Dept: INTERNAL MEDICINE | Facility: CLINIC | Age: 58
End: 2023-12-28
Payer: COMMERCIAL

## 2023-12-28 VITALS
HEIGHT: 63 IN | BODY MASS INDEX: 37.11 KG/M2 | DIASTOLIC BLOOD PRESSURE: 70 MMHG | HEART RATE: 85 BPM | RESPIRATION RATE: 16 BRPM | SYSTOLIC BLOOD PRESSURE: 122 MMHG | WEIGHT: 209.44 LBS | TEMPERATURE: 97.5 F | OXYGEN SATURATION: 98 %

## 2023-12-28 DIAGNOSIS — E78.5 HYPERLIPIDEMIA, UNSPECIFIED: ICD-10-CM

## 2023-12-28 DIAGNOSIS — I10 ESSENTIAL (PRIMARY) HYPERTENSION: ICD-10-CM

## 2023-12-28 PROCEDURE — 99214 OFFICE O/P EST MOD 30 MIN: CPT

## 2023-12-28 RX ORDER — FLUTICASONE PROPIONATE 50 MCG
SPRAY, SUSPENSION NASAL
Refills: 0 | Status: DISCONTINUED | COMMUNITY
End: 2023-12-28

## 2023-12-28 RX ORDER — IPRATROPIUM BROMIDE 42 UG/1
0.06 SPRAY NASAL
Qty: 3 | Refills: 3 | Status: DISCONTINUED | COMMUNITY
Start: 2023-10-27 | End: 2023-12-28

## 2023-12-28 RX ORDER — SULFAMETHOXAZOLE AND TRIMETHOPRIM 800; 160 MG/1; MG/1
800-160 TABLET ORAL TWICE DAILY
Qty: 14 | Refills: 0 | Status: DISCONTINUED | COMMUNITY
Start: 2023-10-04 | End: 2023-12-28

## 2023-12-28 RX ORDER — BROMPHENIRAMINE MALEATE, PSEUDOEPHEDRINE HYDROCHLORIDE AND DEXTROMETHORPHAN HYDROBROMIDE 2; 10; 30 MG/5ML; MG/5ML; MG/5ML
2-30-10 SYRUP ORAL
Qty: 1 | Refills: 0 | Status: DISCONTINUED | COMMUNITY
Start: 2023-09-15 | End: 2023-12-28

## 2023-12-28 RX ORDER — DOXYCYCLINE 100 MG/1
100 CAPSULE ORAL
Qty: 20 | Refills: 0 | Status: DISCONTINUED | COMMUNITY
Start: 2023-10-27 | End: 2023-12-28

## 2023-12-28 RX ORDER — LEVOFLOXACIN 500 MG/1
500 TABLET, FILM COATED ORAL DAILY
Qty: 10 | Refills: 0 | Status: DISCONTINUED | COMMUNITY
Start: 2023-10-31 | End: 2023-12-28

## 2023-12-28 RX ORDER — AZELASTINE HYDROCHLORIDE 137 UG/1
137 SPRAY, METERED NASAL TWICE DAILY
Qty: 3 | Refills: 3 | Status: DISCONTINUED | COMMUNITY
Start: 2023-09-15 | End: 2023-12-28

## 2023-12-28 RX ORDER — CLARITHROMYCIN 500 MG/1
500 TABLET, FILM COATED ORAL
Qty: 20 | Refills: 0 | Status: DISCONTINUED | COMMUNITY
Start: 2023-09-15 | End: 2023-12-28

## 2023-12-28 NOTE — PLAN
[FreeTextEntry1] : Ophthalmology Advised to follow up with ophthalmologist, Dr. Tomlinson for annual diabetic eye exam; results to be requested ENT/Immunology allergies/post-nasal drip - recommended starting non-sedating antihistamine (OTC Claritin or Allegra) - follow up with ENT specialist, Dr. Villar Cardiology essential hypertension - check EKG (results as above) - continue Losartan Potassium 25mg p.o.q.d. as directed - continue low sodium diet; will continue to monitor BP history of hyperlipidemia - continue Rosuvastatin Calcium 20mg p.o.q.d. as directed - continue low cholesterol/low fat diet. Rxn for FLP and CMP given to the pt to complete in 01/24.  Hx of DM2; FHx of heart disease - Rx given for CT Heart Calcium Score11/22 was 36 - follow up with cardiology obesity - continue low carbohydrate/low sugar diet  diabetes - continue Ozempic 2 mg SC weekly as directed; continue Rosuvastatin Calcium 20mg p.o.q.d. as directed; continue Losartan Potassium 25mg p.o.q.d. as directed - continue low carbohydrate/low sugar diet  hypothyroidism (s/p thyroidectomy secondary to papillary thyroid carcinoma) - continue Levothyroxine Sodium 200mcg p.o.q.d. as directed Continue to follow up with endocrinologist, Dr. Atwood Gastroenterology Screening colonoscopy done November 2022 with gastroenterologist, Dr. Coronado Gynecology UTD with seeing GYN, Dr. Lopresti for her annual visit; results of pap smear to be requested Results of most recent breast imaging to be requested from [facility]  will call [facility] pharmacy for flu shot information.  Shingrix - discussed, patient to determine if vaccine is covered under medical benefits of current insurance plan and follow up for 1st dose if interested; otherwise, instructed to follow up at the pharmacy for vaccine administration  I spent 30  Minutes with the patient, half of which we discussed finding on physical exam  and coordinated care.  As well as reviewed my plans and follow ups. Dragon speech recognition software was used to create portions of this document.  An attempt at proofreading has been made to minimize errors please call if any questions arise.

## 2023-12-28 NOTE — HISTORY OF PRESENT ILLNESS
[FreeTextEntry1] : Fasting labs and a general check up [de-identified] : Ms. YADAV is a 58 year- female, with a past medical history as noted below, who present to the office today for fasting labs and a general checkup.  Under care of endo on Ozempic Patient states since she started Ozempic she has noted her A1c steadily climbing.  Recently saw endocrinology increased to 2 mg weekly.  Also having a difficult time getting the prescription filled.  Has to go to a local pharmacy who is charging her $75 co-pay. Since going up to 2 mg weekly has noted a slight decrease in her appetite. Has a follow up with ENT for a follow up on sinus congestion.

## 2023-12-28 NOTE — DATA REVIEWED
[FreeTextEntry1] : Reviewed prior labs also reviewed labs on patient's phone from [facility] in October 2023.  TSH was normal.  Lipid panel was normal.  Comprehensive metabolic was normal.  No A1c was done. Reviewed weights from January 2023 to current has lost almost 40 pounds.

## 2023-12-28 NOTE — HEALTH RISK ASSESSMENT
[Monthly or less (1 pt)] : Monthly or less (1 point) [1 or 2 (0 pts)] : 1 or 2 (0 points) [Never (0 pts)] : Never (0 points) [No] : In the past 12 months have you used drugs other than those required for medical reasons? No [0] : 2) Feeling down, depressed, or hopeless: Not at all (0) [PHQ-2 Negative - No further assessment needed] : PHQ-2 Negative - No further assessment needed [Never] : Never [Audit-CScore] : 1 [[id number]] : 0 Grossly Intact

## 2023-12-28 NOTE — COUNSELING
[Encouraged to increase physical activity] : Encouraged to increase physical activity [Decrease Portions] : decrease portions [Good understanding] : Patient has a good understanding of disease, goals and obesity follow-up plan [FreeTextEntry2] : Low glycemic diet, low-fat low-cholesterol diet

## 2023-12-28 NOTE — ASSESSMENT
[FreeTextEntry1] : Ms. YADAV is a 58 year female, with a past medical history as noted above, who present to the office today for Follow-up on abnormal labs, and a general checkup.

## 2023-12-28 NOTE — REVIEW OF SYSTEMS
[Negative] : Heme/Lymph [Recent Change In Weight] : ~T recent weight change [Nasal Discharge] : nasal discharge [Postnasal Drip] : postnasal drip [Fever] : no fever [Pain] : no pain [Itching] : no itching [Nosebleed] : no nosebleeds [Sore Throat] : no sore throat [FreeTextEntry2] : Weight loss [FreeTextEntry4] : Chronic sinus congestion

## 2023-12-28 NOTE — PHYSICAL EXAM
[No Acute Distress] : no acute distress [Well Nourished] : well nourished [Well Developed] : well developed [Well-Appearing] : well-appearing [Normal Voice/Communication] : normal voice/communication [Normal Sclera/Conjunctiva] : normal sclera/conjunctiva [PERRL] : pupils equal round and reactive to light [EOMI] : extraocular movements intact [Normal Outer Ear/Nose] : the outer ears and nose were normal in appearance [Normal Oropharynx] : the oropharynx was normal [Normal TMs] : both tympanic membranes were normal [Normal Nasal Mucosa] : the nasal mucosa was normal [No JVD] : no jugular venous distention [No Lymphadenopathy] : no lymphadenopathy [Supple] : supple [Thyroid Normal, No Nodules] : the thyroid was normal and there were no nodules present [No Respiratory Distress] : no respiratory distress  [No Accessory Muscle Use] : no accessory muscle use [Clear to Auscultation] : lungs were clear to auscultation bilaterally [Normal Rate] : normal rate  [Regular Rhythm] : with a regular rhythm [Normal S1, S2] : normal S1 and S2 [No Carotid Bruits] : no carotid bruits [No Abdominal Bruit] : a ~M bruit was not heard ~T in the abdomen [Pedal Pulses Present] : the pedal pulses are present [No Edema] : there was no peripheral edema [No Palpable Aorta] : no palpable aorta [No Extremity Clubbing/Cyanosis] : no extremity clubbing/cyanosis [Soft] : abdomen soft [Non Tender] : non-tender [Non-distended] : non-distended [No Masses] : no abdominal mass palpated [No HSM] : no HSM [Normal Bowel Sounds] : normal bowel sounds [Normal Supraclavicular Nodes] : no supraclavicular lymphadenopathy [Normal Posterior Cervical Nodes] : no posterior cervical lymphadenopathy [Normal Anterior Cervical Nodes] : no anterior cervical lymphadenopathy [No CVA Tenderness] : no CVA  tenderness [No Spinal Tenderness] : no spinal tenderness [No Joint Swelling] : no joint swelling [Grossly Normal Strength/Tone] : grossly normal strength/tone [No Rash] : no rash [Coordination Grossly Intact] : coordination grossly intact [No Focal Deficits] : no focal deficits [Normal Gait] : normal gait [Deep Tendon Reflexes (DTR)] : deep tendon reflexes were 2+ and symmetric [Speech Grossly Normal] : speech grossly normal [Memory Grossly Normal] : memory grossly normal [Normal Affect] : the affect was normal [Alert and Oriented x3] : oriented to person, place, and time [Normal Mood] : the mood was normal [Normal Insight/Judgement] : insight and judgment were intact [Scoliosis] : scoliosis [Kyphosis] : no kyphosis [Acne] : no acne [de-identified] : Patient states his blood pressure has been elevated.  Recently took Mucinex DM for for cough.  Last night blood pressure 1/1 80/98.  Ended up going to the emergency room had a chest x-ray and blood work done which was normal.  Advised to follow-up with cardiology as well as primary care provider.  Denies any change in his medication at that time.  Did follow-up with cardiology who started on Norvasc 5 mg daily. Patient states he mostly lives in [place] physicians in [place] for PCP and cardiology.  Comes up to [place] once or twice a year [de-identified] : w/ murmur  [de-identified] : done by GYN [de-identified] : obese  [FreeTextEntry1] : done by GI [de-identified] : as per gyn

## 2023-12-29 ENCOUNTER — APPOINTMENT (OUTPATIENT)
Dept: OTOLARYNGOLOGY | Facility: CLINIC | Age: 58
End: 2023-12-29
Payer: COMMERCIAL

## 2023-12-29 VITALS
DIASTOLIC BLOOD PRESSURE: 74 MMHG | HEART RATE: 86 BPM | BODY MASS INDEX: 37.03 KG/M2 | HEIGHT: 63 IN | WEIGHT: 209 LBS | SYSTOLIC BLOOD PRESSURE: 109 MMHG

## 2023-12-29 DIAGNOSIS — J32.4 CHRONIC PANSINUSITIS: ICD-10-CM

## 2023-12-29 DIAGNOSIS — J34.3 HYPERTROPHY OF NASAL TURBINATES: ICD-10-CM

## 2023-12-29 DIAGNOSIS — J34.2 DEVIATED NASAL SEPTUM: ICD-10-CM

## 2023-12-29 DIAGNOSIS — J34.89 OTHER SPECIFIED DISORDERS OF NOSE AND NASAL SINUSES: ICD-10-CM

## 2023-12-29 PROCEDURE — 99214 OFFICE O/P EST MOD 30 MIN: CPT

## 2023-12-29 NOTE — HISTORY OF PRESENT ILLNESS
[de-identified] : Pt presents with h/o right TM tube presents to evaluate results from CT scan. Pt states that she feels stuffy and is having trouble breathing. Pt denies discolored nasal secretions. Pt states that she has not been using nasal sprays lately. Pt states that she has PND.

## 2023-12-29 NOTE — PHYSICAL EXAM
[] : septum deviated bilaterally [de-identified] : clear discharge [Midline] : trachea located in midline position [de-identified] : class 3 right. class 2 left [Normal] : orientation to person, place, and time: normal [FreeTextEntry2] :  sinuses nontender to percussion sin  sinuses nontender to percussion.  sensations intact

## 2023-12-29 NOTE — ASSESSMENT
[FreeTextEntry1] :  Reviewed and reconciled medications, allergies, PMHx, PSHx, SocHx, FMHx.  Pt presents with h/o right TM tube presents to evaluate results from CT scan. Pt states that she feels stuffy and is having trouble breathing. Pt denies discolored nasal secretions. Pt states that she has not been using nasal sprays lately. Pt states that she has PND.  CT11/17/23: -right maxillary sinus, mild mucosal thickening in floor of sinus -drain pathway obstructed -severely deviated septum right -left sinus thickened lining extended into drain pathway and middle meatus - osiel bullosa -ethmoid cells significant disease in left and moderate in right -frontal sinuses are small and blocked on both sides -sphenoid both sinuses blocked.   Physical exam: -clear nasal discharge -deviated septum -class 3 right tonsils class 2 left tonsils  Plan: -start Dymista -start saline rinse -discussed r/b/a of septoplasty and sinus balloon surgery -follow up in 2 months

## 2024-01-02 RX ORDER — FLUTICASONE PROPIONATE 50 UG/1
50 SPRAY, METERED NASAL
Qty: 3 | Refills: 3 | Status: ACTIVE | COMMUNITY
Start: 2024-01-02 | End: 1900-01-01

## 2024-02-09 LAB — HBA1C MFR BLD HPLC: 6.6

## 2024-03-01 DIAGNOSIS — R82.90 UNSPECIFIED ABNORMAL FINDINGS IN URINE: ICD-10-CM

## 2024-03-12 ENCOUNTER — RX RENEWAL (OUTPATIENT)
Age: 59
End: 2024-03-12

## 2024-03-26 ENCOUNTER — RX RENEWAL (OUTPATIENT)
Age: 59
End: 2024-03-26

## 2024-03-26 RX ORDER — ROSUVASTATIN CALCIUM 20 MG/1
20 TABLET, FILM COATED ORAL DAILY
Qty: 90 | Refills: 0 | Status: ACTIVE | COMMUNITY
Start: 2021-10-22 | End: 1900-01-01

## 2024-04-26 LAB
CHOLEST SERPL-MCNC: 147
HBA1C MFR BLD HPLC: 7
HDLC SERPL-MCNC: 54
LDLC SERPL CALC-MCNC: 72
TRIGL SERPL-MCNC: 120

## 2024-05-13 ENCOUNTER — APPOINTMENT (OUTPATIENT)
Dept: INTERNAL MEDICINE | Facility: CLINIC | Age: 59
End: 2024-05-13
Payer: COMMERCIAL

## 2024-05-13 ENCOUNTER — RESULT CHARGE (OUTPATIENT)
Age: 59
End: 2024-05-13

## 2024-05-13 VITALS
RESPIRATION RATE: 16 BRPM | TEMPERATURE: 97.9 F | SYSTOLIC BLOOD PRESSURE: 126 MMHG | DIASTOLIC BLOOD PRESSURE: 78 MMHG | BODY MASS INDEX: 35.12 KG/M2 | OXYGEN SATURATION: 97 % | HEART RATE: 70 BPM | WEIGHT: 198.19 LBS | HEIGHT: 63 IN

## 2024-05-13 DIAGNOSIS — J31.0 CHRONIC RHINITIS: ICD-10-CM

## 2024-05-13 DIAGNOSIS — J35.1 HYPERTROPHY OF TONSILS: ICD-10-CM

## 2024-05-13 DIAGNOSIS — E11.9 TYPE 2 DIABETES MELLITUS W/OUT COMPLICATIONS: ICD-10-CM

## 2024-05-13 LAB — S PYO AG SPEC QL IA: NORMAL

## 2024-05-13 PROCEDURE — 99214 OFFICE O/P EST MOD 30 MIN: CPT

## 2024-05-13 PROCEDURE — G2211 COMPLEX E/M VISIT ADD ON: CPT

## 2024-05-13 PROCEDURE — 87880 STREP A ASSAY W/OPTIC: CPT | Mod: QW

## 2024-05-13 RX ORDER — AZITHROMYCIN 250 MG/1
250 TABLET, FILM COATED ORAL
Qty: 1 | Refills: 0 | Status: ACTIVE | COMMUNITY
Start: 2024-05-13 | End: 1900-01-01

## 2024-05-13 RX ORDER — AZELASTINE HYDROCHLORIDE 137 UG/1
137 SPRAY, METERED NASAL TWICE DAILY
Qty: 90 | Refills: 3 | Status: DISCONTINUED | COMMUNITY
Start: 2023-12-29 | End: 2024-05-13

## 2024-05-13 NOTE — PLAN
[FreeTextEntry1] : ENT Pharyngitis - Check rapid throat culture which was negative.  Advised to increase fluids, Tylenol or Motrin for fever and pain.  Will check over night culture for confirmation.  Also can use throat lozenges as needed.  Sinusitis - Advised to start claritin along with flonase.  Start zpack. hold crestor   Ophthalmology Advised to follow up with ophthalmologist, Dr. Tomlinson for annual diabetic eye exam; results to be requested Cardiology essential hypertension -Continue Losartan Potassium 25mg p.o.q.d. as directed - continue low sodium diet; will continue to monitor BP history of hyperlipidemia - continue Rosuvastatin Calcium 20mg p.o.q.d. as directed - continue low cholesterol/low hold crestor while on abx.  Hx of DM2; FHx of heart disease - Rx given for CT Heart Calcium Score11/22 was 36 - follow up with cardiology. Continue with Ozempic.  follow up with endo.   obesity - continue low carbohydrate/low sugar diet Continue with weight loss.  Follow up with endo.  diabetes - continue Ozempic 2 mg SC weekly as directed; continue Rosuvastatin Calcium 20mg p.o.q.d. as directed; continue Losartan Potassium 25mg p.o.q.d. as directed - continue low carbohydrate/low sugar diet  hypothyroidism (s/p thyroidectomy secondary to papillary thyroid carcinoma) - continue Levothyroxine Sodium 200mcg p.o.q.d. as directed Continue to follow up with endocrinologist, Dr. Atwood  I spent 30  Minutes with the patient, half of which we discussed finding on physical exam  and coordinated care.  As well as reviewed my plans and follow ups. Dragon speech recognition software was used to create portions of this document.  An attempt at proofreading has been made to minimize errors please call if any questions arise.

## 2024-05-13 NOTE — ASSESSMENT
[FreeTextEntry1] : Ms. YADAV is a 58 year female, with a past medical history as noted above, who present to the office today for sinus congestion and post nasal drip- noted to have a small exudate right tonsil

## 2024-05-13 NOTE — DATA REVIEWED
[FreeTextEntry1] : reviewed a1c from 04/24  7.0  LDL was 72 on 04/2024 lost 11 lbs since last visit  rapid strep was negative

## 2024-05-13 NOTE — HEALTH RISK ASSESSMENT
[Monthly or less (1 pt)] : Monthly or less (1 point) [1 or 2 (0 pts)] : 1 or 2 (0 points) [Never (0 pts)] : Never (0 points) [No] : In the past 12 months have you used drugs other than those required for medical reasons? No [0] : 2) Feeling down, depressed, or hopeless: Not at all (0) [PHQ-2 Negative - No further assessment needed] : PHQ-2 Negative - No further assessment needed [Never] : Never [Audit-CScore] : 1 [[id number]] : 0

## 2024-05-13 NOTE — HISTORY OF PRESENT ILLNESS
[Cold Symptoms] : cold symptoms [Moderate] : moderate [___ Days ago] : [unfilled] days ago [Gradual] : gradually [Constant] : constant [Congestion] : congestion [Stable] : stable [Wheezing] : no wheezing [Shortness Of Breath] : no shortness of breath [Earache] : no earache [Fever] : no fever [FreeTextEntry2] : sinus pressure   post nasal drip [FreeTextEntry5] : unaware  [FreeTextEntry8] : on Flonase nasal spray  Continue to lose weight on Ozempic- States Endo switching her to Mounjaro once it comes available.

## 2024-05-13 NOTE — REVIEW OF SYSTEMS
[Recent Change In Weight] : ~T recent weight change [Nasal Discharge] : nasal discharge [Postnasal Drip] : postnasal drip [Negative] : Heme/Lymph [Fever] : no fever [Pain] : no pain [Itching] : no itching [Nosebleed] : no nosebleeds [Sore Throat] : no sore throat [Chest Pain] : no chest pain [Palpitations] : no palpitations [Leg Claudication] : no leg claudication [Orthopnea] : no orthopnea [Shortness Of Breath] : no shortness of breath [Wheezing] : no wheezing [Cough] : no cough [Abdominal Pain] : no abdominal pain [Nausea] : no nausea [Constipation] : no constipation [Vomiting] : no vomiting [Dysuria] : no dysuria [Joint Pain] : no joint pain [Joint Stiffness] : no joint stiffness [Joint Swelling] : no joint swelling [Headache] : no headache [Memory Loss] : no memory loss [Easy Bleeding] : no easy bleeding [Easy Bruising] : no easy bruising [Swollen Glands] : no swollen glands [FreeTextEntry2] : Weight loss [FreeTextEntry4] : Chronic sinus congestion - sinus pressure

## 2024-05-13 NOTE — PHYSICAL EXAM
[No Acute Distress] : no acute distress [Well Nourished] : well nourished [Well Developed] : well developed [Well-Appearing] : well-appearing [Normal Voice/Communication] : normal voice/communication [Normal Sclera/Conjunctiva] : normal sclera/conjunctiva [PERRL] : pupils equal round and reactive to light [EOMI] : extraocular movements intact [Normal Outer Ear/Nose] : the outer ears and nose were normal in appearance [Normal Oropharynx] : the oropharynx was normal [Normal Nasal Mucosa] : the nasal mucosa was normal [No JVD] : no jugular venous distention [No Lymphadenopathy] : no lymphadenopathy [Supple] : supple [Thyroid Normal, No Nodules] : the thyroid was normal and there were no nodules present [No Respiratory Distress] : no respiratory distress  [No Accessory Muscle Use] : no accessory muscle use [Clear to Auscultation] : lungs were clear to auscultation bilaterally [Normal Rate] : normal rate  [Regular Rhythm] : with a regular rhythm [Normal S1, S2] : normal S1 and S2 [No Carotid Bruits] : no carotid bruits [No Abdominal Bruit] : a ~M bruit was not heard ~T in the abdomen [Pedal Pulses Present] : the pedal pulses are present [No Edema] : there was no peripheral edema [No Palpable Aorta] : no palpable aorta [No Extremity Clubbing/Cyanosis] : no extremity clubbing/cyanosis [Soft] : abdomen soft [Non Tender] : non-tender [Non-distended] : non-distended [No Masses] : no abdominal mass palpated [No HSM] : no HSM [Normal Bowel Sounds] : normal bowel sounds [Normal Supraclavicular Nodes] : no supraclavicular lymphadenopathy [Normal Posterior Cervical Nodes] : no posterior cervical lymphadenopathy [Normal Anterior Cervical Nodes] : no anterior cervical lymphadenopathy [No CVA Tenderness] : no CVA  tenderness [No Spinal Tenderness] : no spinal tenderness [Scoliosis] : scoliosis [No Joint Swelling] : no joint swelling [Grossly Normal Strength/Tone] : grossly normal strength/tone [No Rash] : no rash [Coordination Grossly Intact] : coordination grossly intact [No Focal Deficits] : no focal deficits [Normal Gait] : normal gait [Deep Tendon Reflexes (DTR)] : deep tendon reflexes were 2+ and symmetric [Speech Grossly Normal] : speech grossly normal [Memory Grossly Normal] : memory grossly normal [Normal Affect] : the affect was normal [Alert and Oriented x3] : oriented to person, place, and time [Normal Mood] : the mood was normal [Normal Insight/Judgement] : insight and judgment were intact [Kyphosis] : no kyphosis [Acne] : no acne [de-identified] : sinus congestion, post nasal drip - myringotomy tube right ear  Patient states he mostly lives in [place] has physicians in [place] for [place]CP and cardiology.  Comes up to [place] once or twice a year [de-identified] : w/ murmur  [de-identified] : done by GYN [de-identified] : obese  [FreeTextEntry1] : done by GI [de-identified] : as per gyn

## 2024-05-20 LAB — BACTERIA THROAT CULT: NORMAL

## 2024-06-20 ENCOUNTER — RX RENEWAL (OUTPATIENT)
Age: 59
End: 2024-06-20

## 2024-06-21 ENCOUNTER — RX RENEWAL (OUTPATIENT)
Age: 59
End: 2024-06-21

## 2024-06-21 RX ORDER — EZETIMIBE 10 MG/1
10 TABLET ORAL
Qty: 90 | Refills: 0 | Status: ACTIVE | COMMUNITY
Start: 2023-09-05 | End: 1900-01-01

## 2024-06-24 RX ORDER — CLARITHROMYCIN 500 MG/1
500 TABLET, FILM COATED ORAL
Qty: 20 | Refills: 0 | Status: ACTIVE | COMMUNITY
Start: 2023-01-03 | End: 1900-01-01

## 2024-07-08 ENCOUNTER — RX RENEWAL (OUTPATIENT)
Age: 59
End: 2024-07-08

## 2024-08-13 ENCOUNTER — APPOINTMENT (OUTPATIENT)
Dept: OTOLARYNGOLOGY | Facility: CLINIC | Age: 59
End: 2024-08-13
Payer: COMMERCIAL

## 2024-08-13 VITALS
HEIGHT: 63 IN | SYSTOLIC BLOOD PRESSURE: 124 MMHG | BODY MASS INDEX: 35.44 KG/M2 | HEART RATE: 69 BPM | DIASTOLIC BLOOD PRESSURE: 79 MMHG | WEIGHT: 200 LBS

## 2024-08-13 DIAGNOSIS — E11.9 TYPE 2 DIABETES MELLITUS W/OUT COMPLICATIONS: ICD-10-CM

## 2024-08-13 DIAGNOSIS — J01.91 ACUTE RECURRENT SINUSITIS, UNSPECIFIED: ICD-10-CM

## 2024-08-13 DIAGNOSIS — H60.501 UNSPECIFIED ACUTE NONINFECTIVE OTITIS EXTERNA, RIGHT EAR: ICD-10-CM

## 2024-08-13 DIAGNOSIS — G47.33 OBSTRUCTIVE SLEEP APNEA (ADULT) (PEDIATRIC): ICD-10-CM

## 2024-08-13 DIAGNOSIS — J33.9 NASAL POLYP, UNSPECIFIED: ICD-10-CM

## 2024-08-13 DIAGNOSIS — J34.2 DEVIATED NASAL SEPTUM: ICD-10-CM

## 2024-08-13 DIAGNOSIS — J34.3 HYPERTROPHY OF NASAL TURBINATES: ICD-10-CM

## 2024-08-13 DIAGNOSIS — T16.1XXA FOREIGN BODY IN RIGHT EAR, INITIAL ENCOUNTER: ICD-10-CM

## 2024-08-13 DIAGNOSIS — H61.23 IMPACTED CERUMEN, BILATERAL: ICD-10-CM

## 2024-08-13 PROCEDURE — 99214 OFFICE O/P EST MOD 30 MIN: CPT | Mod: 25

## 2024-08-13 PROCEDURE — 31231 NASAL ENDOSCOPY DX: CPT | Mod: 59

## 2024-08-13 PROCEDURE — 69210 REMOVE IMPACTED EAR WAX UNI: CPT

## 2024-08-13 RX ORDER — CIPROFLOXACIN AND DEXAMETHASONE 3; 1 MG/ML; MG/ML
0.3-0.1 SUSPENSION/ DROPS AURICULAR (OTIC) TWICE DAILY
Qty: 1 | Refills: 3 | Status: ACTIVE | COMMUNITY
Start: 2024-08-13 | End: 1900-01-01

## 2024-08-13 RX ORDER — LEVOFLOXACIN 500 MG/1
500 TABLET, FILM COATED ORAL DAILY
Qty: 10 | Refills: 0 | Status: ACTIVE | COMMUNITY
Start: 2024-08-13 | End: 1900-01-01

## 2024-08-13 NOTE — PHYSICAL EXAM
[] : septum deviated bilaterally [Midline] : trachea located in midline position [Normal] : no rashes [Hearing Loss Right Only] : normal [Hearing Loss Left Only] : normal [FreeTextEntry8] :  cerumen impaction removed via suction. Peroxide added and the remaining cerumen removed via suction. blue foreign body [FreeTextEntry9] :  cerumen impaction removed via curettage and suction [de-identified] : inflamed turbinates [de-identified] : purulent discharge [de-identified] : cyst on the right tonsil [de-identified] : visible PND [FreeTextEntry2] :  sinuses nontender to percussion [de-identified] : left eye tearing

## 2024-08-13 NOTE — PROCEDURE
[Recalcitrant Symptoms] : recalcitrant symptoms  [FreeTextEntry1] : Removal of Foreign Body from the Right Ear Canal [FreeTextEntry2] : Right ear canal with blue foreign body [FreeTextEntry3] :  Procedure: Removal of Foreign Body from the Right Ear Canal: The risks, benefits, and alternatives were discussed with the patient. Right ear canal with blue foreign body - removed foreign body by curettage. The patient tolerated the procedure well. [FreeTextEntry6] :  Procedure: Rigid Nasal Endoscopy with a Culture: Risks, benefits, and alternatives of rigid endoscopy were explained to the patient. The patient gave oral consent to proceed. The rigid scope was inserted into the right nasal cavity. Endoscopy of the inferior and middle meatus was performed. No polyp, mass, or lesion was appreciated. Olfactory cleft was clear. Spheno-ethmoid recess clear. Nasopharynx was clear. Turbinates were without mass. There was a deviated septum right. The middle meatus is swollen and edematous with a purulent discharge. The procedure was repeated on the contralateral side with similar findings. Suction used to collect discharge from the right middle meatus. Pathology sent to lab. -deviated septum right -middle meatus is swollen and edematous with a purulent discharge

## 2024-08-13 NOTE — ADDENDUM
[FreeTextEntry1] :  Documented by Juan Luis Molina acting as scribe for Dr. Villar on 08/13/2024. All Medical record entries made by the Scribe were at my, Dr. Villar, direction and personally dictated by me on 08/13/2024 . I have reviewed the chart and agree that the record accurately reflects my personal performance of the history, physical exam, assessment and plan. I have also personally directed, reviewed, and agreed with the discharge instructions.

## 2024-08-13 NOTE — ASSESSMENT
[FreeTextEntry1] : Reviewed and reconciled medications, allergies, PMHx, PSHx, SocHx, FMHx.  Patient presents today with h/o right TM tube, nasal obstruction, and chronic pansinusitis presents today stating that she has had nasal congestion and head pain/pressure for the past few months. She states that she was given Azithromycin in May and Clarithromycin in June for similar sinus infection-like symptoms. She states that she has been feeling very tired the last few days. She states that she uses tissues in her ears because they have been getting very itchy. She denies using a CPAP machine to treat her sleep apnea.   Physical exam: -NOSE score 60 -TNSS 3 -right ear canal: cerumen impaction removed via suction. Peroxide added and the remaining cerumen removed via suction. blue foreign body -left ear canal: cerumen impaction removed via curettage and suction -deviated septum bilaterally -inflamed turbinates -purulent discharge -cyst on the right tonsil -visible PND -left eye tearing  ENT Procedure: Removal of Foreign Body from the Right Ear Canal and Rigid Nasal Endoscopy with a Culture -deviated septum right -middle meatus is swollen and edematous with a purulent discharge   Plan: culture taken - results pending -Use Afrin for up to 3 days -Continue Flonase - 2 sprays bilaterally QD, spray laterally -Start Levaquin - 1 pill a day with food (no exercise) -Start Ciprodex ear drops - 4 drops BID in the left ear -FU in 10 days

## 2024-08-13 NOTE — HISTORY OF PRESENT ILLNESS
[de-identified] : Patient presents today with h/o right TM tube, nasal obstruction, and chronic pansinusitis presents today stating that she has had nasal congestion and head pain/pressure for the past few months. She states that she was given Azithromycin in May and Clarithromycin in June for similar sinus infection-like symptoms. She states that she has been feeling very tired the last few days. She states that she uses tissues in her ears because they have been getting very itchy. She denies using a CPAP machine to treat her sleep apnea.

## 2024-08-19 LAB — EAR NOSE AND THROAT CULTURE: ABNORMAL

## 2024-08-23 ENCOUNTER — APPOINTMENT (OUTPATIENT)
Dept: OTOLARYNGOLOGY | Facility: CLINIC | Age: 59
End: 2024-08-23
Payer: COMMERCIAL

## 2024-08-23 VITALS
WEIGHT: 200 LBS | BODY MASS INDEX: 35.44 KG/M2 | HEART RATE: 74 BPM | DIASTOLIC BLOOD PRESSURE: 70 MMHG | HEIGHT: 63 IN | SYSTOLIC BLOOD PRESSURE: 106 MMHG

## 2024-08-23 DIAGNOSIS — Z96.22 MYRINGOTOMY TUBE(S) STATUS: ICD-10-CM

## 2024-08-23 DIAGNOSIS — J34.89 OTHER SPECIFIED DISORDERS OF NOSE AND NASAL SINUSES: ICD-10-CM

## 2024-08-23 DIAGNOSIS — J32.4 CHRONIC PANSINUSITIS: ICD-10-CM

## 2024-08-23 DIAGNOSIS — J31.0 CHRONIC RHINITIS: ICD-10-CM

## 2024-08-23 DIAGNOSIS — H91.8X3 OTHER SPECIFIED HEARING LOSS, BILATERAL: ICD-10-CM

## 2024-08-23 PROCEDURE — 92557 COMPREHENSIVE HEARING TEST: CPT

## 2024-08-23 PROCEDURE — 31231 NASAL ENDOSCOPY DX: CPT

## 2024-08-23 PROCEDURE — 99213 OFFICE O/P EST LOW 20 MIN: CPT | Mod: 25

## 2024-08-23 PROCEDURE — 92567 TYMPANOMETRY: CPT

## 2024-08-23 RX ORDER — AZELASTINE HYDROCHLORIDE 137 UG/1
137 SPRAY, METERED NASAL TWICE DAILY
Qty: 1 | Refills: 5 | Status: ACTIVE | COMMUNITY
Start: 2024-08-23 | End: 1900-01-01

## 2024-08-23 RX ORDER — EMPAGLIFLOZIN AND METFORMIN HYDROCHLORIDE 12.5; 1 MG/1; MG/1
TABLET ORAL
Refills: 0 | Status: ACTIVE | COMMUNITY

## 2024-08-23 NOTE — PROCEDURE
[Recalcitrant Symptoms] : recalcitrant symptoms  [FreeTextEntry6] :  Procedure: Flexible Nasal Endoscopy: Risks, benefits, and alternatives of flexible endoscopy were explained to the patient. The patient gave oral consent to proceed. The flexible scope was inserted into the right nasal cavity. Endoscopy of the inferior and middle meatus was performed. No polyp, mass, or lesion was appreciated. Olfactory cleft was clear. Spheno-ethmoid recess is clear. Nasopharynx was clear. Turbinates were without mass. The procedure was repeated on the contralateral side with similar findings. -mucoid purulence on the left side -right middle turbinate swollen -right middle meatus normal

## 2024-08-23 NOTE — ASSESSMENT
[FreeTextEntry1] : Reviewed and reconciled medications, allergies, PMHx, PSHx, SocHx, FMHx.  Patient with h/o right TM tube, nasal obstruction, ELIOT, nasal polyps, and chronic pansinusitis presents today for a follow up. She states that she has still been getting sinus headaches.  ENT Culture 8/19/24: -Few Serratia marcescens - sensitive to Levaquin  Physical exam: -left ear canal: minimal dry cerumen and blood -right TM: tube laying across the ear drum -deviated septum bilaterally -cyst on the right tonsil -dry intraorally  ENT Procedure:  Flexible nasal endoscopy -mucoid purulence on the left side -right middle turbinate swollen -right middle meatus normal  Audio 8/23/24: -Tymps: Type As AD; Type C AS -Hearing -8000 Hz AU -100% discrimination at 50 dB AU -right TPP: -4 -left TPP: -130   Plan:  Audio - results interpreted by Dr. Villar and reviewed with the patient. -Continue Flonase - 2 sprays bilaterally QD, spray laterally -Start Astelin - 2 sprays bilaterally BID, spray laterally -Ordered CT Sinuses for potential surgical planning -FU with results from CT scan

## 2024-08-23 NOTE — HISTORY OF PRESENT ILLNESS
[de-identified] : Patient with h/o right TM tube, nasal obstruction, ELIOT, nasal polyps, and chronic pansinusitis presents today for a follow up. She states that she has still been getting sinus headaches.

## 2024-08-23 NOTE — DATA REVIEWED
[de-identified] : Audio 8/23/24: -Tymps: Type As AD; Type C AS -Hearing -8000 Hz AU -100% discrimination at 50 dB AU -right TPP: -4 -left TPP: -130 [de-identified] : ENT Culture 8/19/24: -Few Serratia marcescens - sensitive to Levaquin

## 2024-08-23 NOTE — PHYSICAL EXAM
[Farias Test Lateralizes To Right] : tone lateralization to the right [] : septum deviated bilaterally [Midline] : trachea located in midline position [Normal] : no rashes [Hearing Loss Right Only] : normal [Hearing Loss Left Only] : normal [FreeTextEntry9] : minimal dry cerumen and blood [de-identified] : tube laying across the ear drum [de-identified] : cyst on the right tonsil [de-identified] : dry intraorally [FreeTextEntry2] :  sinuses nontender to percussion

## 2024-08-23 NOTE — ADDENDUM
[FreeTextEntry1] :  Documented by Juan Luis Molina acting as scribe for Dr. Villar on 08/23/2024. All Medical record entries made by the Scribe were at my, Dr. Villar, direction and personally dictated by me on 08/23/2024 . I have reviewed the chart and agree that the record accurately reflects my personal performance of the history, physical exam, assessment and plan. I have also personally directed, reviewed, and agreed with the discharge instructions.

## 2024-09-19 ENCOUNTER — RX RENEWAL (OUTPATIENT)
Age: 59
End: 2024-09-19

## 2024-09-20 ENCOUNTER — RX RENEWAL (OUTPATIENT)
Age: 59
End: 2024-09-20

## 2024-09-30 ENCOUNTER — APPOINTMENT (OUTPATIENT)
Dept: OTOLARYNGOLOGY | Facility: CLINIC | Age: 59
End: 2024-09-30

## 2024-12-10 RX ORDER — OLOPATADINE HYDROCHLORIDE AND MOMETASONE FUROATE 25; 665 UG/1; UG/1
665-25 SPRAY, METERED NASAL
Qty: 1 | Refills: 0 | Status: ACTIVE | COMMUNITY
Start: 2024-12-10 | End: 1900-01-01

## 2024-12-13 LAB — A1CG - A1C WITH ESTIMATED AVERAGE GLUCOSE: 6.7

## 2024-12-31 ENCOUNTER — APPOINTMENT (OUTPATIENT)
Dept: OTOLARYNGOLOGY | Facility: CLINIC | Age: 59
End: 2024-12-31
Payer: COMMERCIAL

## 2024-12-31 VITALS
BODY MASS INDEX: 31.96 KG/M2 | HEIGHT: 63 IN | HEART RATE: 71 BPM | WEIGHT: 180.38 LBS | SYSTOLIC BLOOD PRESSURE: 102 MMHG | DIASTOLIC BLOOD PRESSURE: 69 MMHG

## 2024-12-31 DIAGNOSIS — J31.0 CHRONIC RHINITIS: ICD-10-CM

## 2024-12-31 DIAGNOSIS — H91.8X3 OTHER SPECIFIED HEARING LOSS, BILATERAL: ICD-10-CM

## 2024-12-31 DIAGNOSIS — R49.0 DYSPHONIA: ICD-10-CM

## 2024-12-31 DIAGNOSIS — Z96.22 MYRINGOTOMY TUBE(S) STATUS: ICD-10-CM

## 2024-12-31 DIAGNOSIS — J34.2 DEVIATED NASAL SEPTUM: ICD-10-CM

## 2024-12-31 DIAGNOSIS — J38.4 EDEMA OF LARYNX: ICD-10-CM

## 2024-12-31 DIAGNOSIS — J32.4 CHRONIC PANSINUSITIS: ICD-10-CM

## 2024-12-31 PROCEDURE — 31575 DIAGNOSTIC LARYNGOSCOPY: CPT

## 2024-12-31 PROCEDURE — 99214 OFFICE O/P EST MOD 30 MIN: CPT | Mod: 25

## 2025-01-05 ENCOUNTER — NON-APPOINTMENT (OUTPATIENT)
Age: 60
End: 2025-01-05

## 2025-02-28 RX ORDER — AZITHROMYCIN 500 MG/1
500 TABLET, FILM COATED ORAL DAILY
Qty: 7 | Refills: 0 | Status: ACTIVE | COMMUNITY
Start: 2025-02-28 | End: 1900-01-01

## 2025-04-01 ENCOUNTER — APPOINTMENT (OUTPATIENT)
Dept: INTERNAL MEDICINE | Facility: CLINIC | Age: 60
End: 2025-04-01

## 2025-04-14 ENCOUNTER — APPOINTMENT (OUTPATIENT)
Dept: OTOLARYNGOLOGY | Facility: CLINIC | Age: 60
End: 2025-04-14

## 2025-04-16 ENCOUNTER — NON-APPOINTMENT (OUTPATIENT)
Age: 60
End: 2025-04-16

## 2025-04-16 ENCOUNTER — APPOINTMENT (OUTPATIENT)
Dept: OTOLARYNGOLOGY | Facility: CLINIC | Age: 60
End: 2025-04-16
Payer: COMMERCIAL

## 2025-04-16 VITALS
DIASTOLIC BLOOD PRESSURE: 67 MMHG | HEART RATE: 71 BPM | WEIGHT: 168 LBS | BODY MASS INDEX: 29.77 KG/M2 | HEIGHT: 63 IN | SYSTOLIC BLOOD PRESSURE: 95 MMHG

## 2025-04-16 DIAGNOSIS — J31.0 CHRONIC RHINITIS: ICD-10-CM

## 2025-04-16 DIAGNOSIS — J01.91 ACUTE RECURRENT SINUSITIS, UNSPECIFIED: ICD-10-CM

## 2025-04-16 DIAGNOSIS — H60.90 UNSPECIFIED OTITIS EXTERNA, UNSPECIFIED EAR: ICD-10-CM

## 2025-04-16 DIAGNOSIS — J34.89 OTHER SPECIFIED DISORDERS OF NOSE AND NASAL SINUSES: ICD-10-CM

## 2025-04-16 DIAGNOSIS — J34.2 DEVIATED NASAL SEPTUM: ICD-10-CM

## 2025-04-16 PROCEDURE — 99213 OFFICE O/P EST LOW 20 MIN: CPT

## 2025-04-16 RX ORDER — AZELASTINE 137 UG/1
137 SPRAY, METERED NASAL TWICE DAILY
Qty: 3 | Refills: 3 | Status: ACTIVE | COMMUNITY
Start: 2025-04-16 | End: 1900-01-01

## 2025-04-16 RX ORDER — FLUCONAZOLE 150 MG/1
150 TABLET ORAL
Refills: 0 | Status: ACTIVE | COMMUNITY

## 2025-04-16 RX ORDER — METHYLPREDNISOLONE 4 MG/1
4 TABLET ORAL
Qty: 1 | Refills: 0 | Status: ACTIVE | COMMUNITY
Start: 2025-04-16 | End: 1900-01-01

## 2025-04-22 LAB — EAR NOSE AND THROAT CULTURE: ABNORMAL
